# Patient Record
Sex: FEMALE | Race: BLACK OR AFRICAN AMERICAN | Employment: PART TIME | URBAN - METROPOLITAN AREA
[De-identification: names, ages, dates, MRNs, and addresses within clinical notes are randomized per-mention and may not be internally consistent; named-entity substitution may affect disease eponyms.]

---

## 2021-12-11 ENCOUNTER — APPOINTMENT (OUTPATIENT)
Dept: MRI IMAGING | Age: 31
End: 2021-12-11
Attending: EMERGENCY MEDICINE
Payer: COMMERCIAL

## 2021-12-11 ENCOUNTER — APPOINTMENT (OUTPATIENT)
Dept: CT IMAGING | Age: 31
End: 2021-12-11
Attending: EMERGENCY MEDICINE
Payer: COMMERCIAL

## 2021-12-11 ENCOUNTER — HOSPITAL ENCOUNTER (EMERGENCY)
Age: 31
Discharge: HOME | End: 2021-12-11
Attending: EMERGENCY MEDICINE
Payer: COMMERCIAL

## 2021-12-11 VITALS
WEIGHT: 262 LBS | RESPIRATION RATE: 17 BRPM | TEMPERATURE: 97.8 F | BODY MASS INDEX: 37.51 KG/M2 | DIASTOLIC BLOOD PRESSURE: 76 MMHG | HEIGHT: 70 IN | HEART RATE: 93 BPM | OXYGEN SATURATION: 100 % | SYSTOLIC BLOOD PRESSURE: 133 MMHG

## 2021-12-11 DIAGNOSIS — R20.0 LEFT LEG NUMBNESS: ICD-10-CM

## 2021-12-11 DIAGNOSIS — G43.809 OTHER MIGRAINE WITHOUT STATUS MIGRAINOSUS, NOT INTRACTABLE: Primary | ICD-10-CM

## 2021-12-11 LAB
ALBUMIN SERPL-MCNC: 3.5 G/DL (ref 3.5–5)
ALBUMIN/GLOB SERPL: 0.7 {RATIO} (ref 1.1–2.2)
ALP SERPL-CCNC: 110 U/L (ref 45–117)
ALT SERPL-CCNC: 26 U/L (ref 12–78)
ANION GAP SERPL CALC-SCNC: 5 MMOL/L (ref 5–15)
AST SERPL-CCNC: 28 U/L (ref 15–37)
BASOPHILS # BLD: 0 K/UL (ref 0–0.1)
BASOPHILS NFR BLD: 1 % (ref 0–1)
BILIRUB SERPL-MCNC: 0.5 MG/DL (ref 0.2–1)
BUN SERPL-MCNC: 7 MG/DL (ref 6–20)
BUN/CREAT SERPL: 8 (ref 12–20)
CALCIUM SERPL-MCNC: 8.9 MG/DL (ref 8.5–10.1)
CHLORIDE SERPL-SCNC: 111 MMOL/L (ref 97–108)
CO2 SERPL-SCNC: 24 MMOL/L (ref 21–32)
CREAT SERPL-MCNC: 0.86 MG/DL (ref 0.55–1.02)
DIFFERENTIAL METHOD BLD: ABNORMAL
EOSINOPHIL # BLD: 0.2 K/UL (ref 0–0.4)
EOSINOPHIL NFR BLD: 3 % (ref 0–7)
ERYTHROCYTE [DISTWIDTH] IN BLOOD BY AUTOMATED COUNT: 19.9 % (ref 11.5–14.5)
GLOBULIN SER CALC-MCNC: 4.7 G/DL (ref 2–4)
GLUCOSE SERPL-MCNC: 108 MG/DL (ref 65–100)
HCG UR QL: NEGATIVE
HCT VFR BLD AUTO: 30.8 % (ref 35–47)
HGB BLD-MCNC: 9 G/DL (ref 11.5–16)
IMM GRANULOCYTES # BLD AUTO: 0 K/UL (ref 0–0.04)
IMM GRANULOCYTES NFR BLD AUTO: 0 % (ref 0–0.5)
LYMPHOCYTES # BLD: 2.4 K/UL (ref 0.8–3.5)
LYMPHOCYTES NFR BLD: 41 % (ref 12–49)
MCH RBC QN AUTO: 22 PG (ref 26–34)
MCHC RBC AUTO-ENTMCNC: 29.2 G/DL (ref 30–36.5)
MCV RBC AUTO: 75.1 FL (ref 80–99)
MONOCYTES # BLD: 0.4 K/UL (ref 0–1)
MONOCYTES NFR BLD: 7 % (ref 5–13)
NEUTS SEG # BLD: 2.9 K/UL (ref 1.8–8)
NEUTS SEG NFR BLD: 49 % (ref 32–75)
NRBC # BLD: 0 K/UL (ref 0–0.01)
NRBC BLD-RTO: 0 PER 100 WBC
PLATELET # BLD AUTO: 287 K/UL (ref 150–400)
POTASSIUM SERPL-SCNC: 3.5 MMOL/L (ref 3.5–5.1)
PROT SERPL-MCNC: 8.2 G/DL (ref 6.4–8.2)
RBC # BLD AUTO: 4.1 M/UL (ref 3.8–5.2)
SODIUM SERPL-SCNC: 140 MMOL/L (ref 136–145)
WBC # BLD AUTO: 5.9 K/UL (ref 3.6–11)

## 2021-12-11 PROCEDURE — 96375 TX/PRO/DX INJ NEW DRUG ADDON: CPT

## 2021-12-11 PROCEDURE — 96365 THER/PROPH/DIAG IV INF INIT: CPT

## 2021-12-11 PROCEDURE — 85025 COMPLETE CBC W/AUTO DIFF WBC: CPT

## 2021-12-11 PROCEDURE — 36415 COLL VENOUS BLD VENIPUNCTURE: CPT

## 2021-12-11 PROCEDURE — 74011000636 HC RX REV CODE- 636

## 2021-12-11 PROCEDURE — 96366 THER/PROPH/DIAG IV INF ADDON: CPT

## 2021-12-11 PROCEDURE — 99285 EMERGENCY DEPT VISIT HI MDM: CPT

## 2021-12-11 PROCEDURE — 70496 CT ANGIOGRAPHY HEAD: CPT

## 2021-12-11 PROCEDURE — 80053 COMPREHEN METABOLIC PANEL: CPT

## 2021-12-11 PROCEDURE — 81025 URINE PREGNANCY TEST: CPT

## 2021-12-11 PROCEDURE — 74011250636 HC RX REV CODE- 250/636: Performed by: RADIOLOGY

## 2021-12-11 PROCEDURE — A9575 INJ GADOTERATE MEGLUMI 0.1ML: HCPCS | Performed by: RADIOLOGY

## 2021-12-11 PROCEDURE — 74011250636 HC RX REV CODE- 250/636: Performed by: EMERGENCY MEDICINE

## 2021-12-11 PROCEDURE — 70450 CT HEAD/BRAIN W/O DYE: CPT

## 2021-12-11 PROCEDURE — 70553 MRI BRAIN STEM W/O & W/DYE: CPT

## 2021-12-11 PROCEDURE — 70544 MR ANGIOGRAPHY HEAD W/O DYE: CPT

## 2021-12-11 RX ORDER — KETOROLAC TROMETHAMINE 30 MG/ML
15 INJECTION, SOLUTION INTRAMUSCULAR; INTRAVENOUS
Status: COMPLETED | OUTPATIENT
Start: 2021-12-11 | End: 2021-12-11

## 2021-12-11 RX ORDER — PROCHLORPERAZINE EDISYLATE 5 MG/ML
10 INJECTION INTRAMUSCULAR; INTRAVENOUS
Status: COMPLETED | OUTPATIENT
Start: 2021-12-11 | End: 2021-12-11

## 2021-12-11 RX ORDER — HEPARIN SODIUM 5000 [USP'U]/ML
4000 INJECTION, SOLUTION INTRAVENOUS; SUBCUTANEOUS ONCE
Status: DISCONTINUED | OUTPATIENT
Start: 2021-12-11 | End: 2021-12-11

## 2021-12-11 RX ORDER — GADOTERATE MEGLUMINE 376.9 MG/ML
20 INJECTION INTRAVENOUS
Status: COMPLETED | OUTPATIENT
Start: 2021-12-11 | End: 2021-12-11

## 2021-12-11 RX ORDER — CLOPIDOGREL BISULFATE 75 MG/1
300 TABLET ORAL ONCE
Status: DISCONTINUED | OUTPATIENT
Start: 2021-12-11 | End: 2021-12-11

## 2021-12-11 RX ORDER — MAGNESIUM SULFATE HEPTAHYDRATE 40 MG/ML
2 INJECTION, SOLUTION INTRAVENOUS ONCE
Status: COMPLETED | OUTPATIENT
Start: 2021-12-11 | End: 2021-12-11

## 2021-12-11 RX ORDER — DIPHENHYDRAMINE HYDROCHLORIDE 50 MG/ML
25 INJECTION, SOLUTION INTRAMUSCULAR; INTRAVENOUS
Status: COMPLETED | OUTPATIENT
Start: 2021-12-11 | End: 2021-12-11

## 2021-12-11 RX ADMIN — MAGNESIUM SULFATE HEPTAHYDRATE 2 G: 2 INJECTION, SOLUTION INTRAVENOUS at 12:16

## 2021-12-11 RX ADMIN — PROCHLORPERAZINE EDISYLATE 10 MG: 5 INJECTION INTRAMUSCULAR; INTRAVENOUS at 09:30

## 2021-12-11 RX ADMIN — SODIUM CHLORIDE 1000 ML: 9 INJECTION, SOLUTION INTRAVENOUS at 09:30

## 2021-12-11 RX ADMIN — KETOROLAC TROMETHAMINE 15 MG: 30 INJECTION, SOLUTION INTRAMUSCULAR at 09:25

## 2021-12-11 RX ADMIN — IOPAMIDOL 100 ML: 755 INJECTION, SOLUTION INTRAVENOUS at 10:02

## 2021-12-11 RX ADMIN — DIPHENHYDRAMINE HYDROCHLORIDE 25 MG: 50 INJECTION, SOLUTION INTRAMUSCULAR; INTRAVENOUS at 09:18

## 2021-12-11 RX ADMIN — GADOTERATE MEGLUMINE 20 ML: 376.9 INJECTION INTRAVENOUS at 14:00

## 2021-12-11 NOTE — Clinical Note
[facility]  [facility] EMERGENCY DEPT  [address] [phone]  [phone]    Work/School Note    Date: 12/11/2021    To Whom It May concern:    Ene Greer was seen and treated today in the emergency room by the following provider(s):  Attending Provider: Ethelene Duane, MD.      Ene Greer is excused from work/school on 12/11/21 and 12/12/21. She is medically clear to return to work/school on 12/13/2021.    Patient should not be doing any bending over or lifting until cleared by neurologist.    Sincerely,          Fadia Hernandez MD

## 2021-12-11 NOTE — ED TRIAGE NOTES
Patient arrives ambulatory to ED with complaints of headache for a few weeks    Denies taking medications for pain     History of TIA in August , missed neuro follow up last week

## 2021-12-11 NOTE — DISCHARGE INSTRUCTIONS
Please return to the emergency department if your symptoms are worsening, you have recurrent numbness or weakness in your extremities, or have any other symptoms you find concerning. In the meantime please follow-up with the primary care doctor and neurologist by calling the numbers provided.

## 2021-12-11 NOTE — ED PROVIDER NOTES
HPI   16-year-old woman with a history of previous TIA who presents to the emergency department with a headache. She has had an intermittent headache off and on for the last 2 weeks. She describes as a throbbing sensation on the left side of her head. This is made worse when she bends over and does work at [employer] she works out. She denies any associated visual changes, or photophobia. In [place] she previously presented to the emergency department with upper and lower extremity weakness as well as headache. 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 She tells me last night she had an episode where her left leg felt numb but this resolved quickly. She denies any weakness in her upper and lower extremities or speech difficulties. She is not anticoagulated. She denies any nausea or vomiting. No past medical history on file. No past surgical history on file. No family history on file. Social History     Socioeconomic History    Marital status: SINGLE     Spouse name: Not on file    Number of children: Not on file    Years of education: Not on file    Highest education level: Not on file   Occupational History    Not on file   Tobacco Use    Smoking status: Not on file    Smokeless tobacco: Not on file   Substance and Sexual Activity    Alcohol use: Not on file    Drug use: Not on file    Sexual activity: Not on file   Other Topics Concern    Not on file   Social History Narrative    Not on file     Social Determinants of Health     Financial Resource Strain:     Difficulty of Paying Living Expenses: Not on file   Food Insecurity:     Worried About Running Out of Food in the Last Year: Not on file    Curtis of Food in the Last Year: Not on file   Transportation Needs:     Lack of Transportation (Medical): Not on file    Lack of Transportation (Non-Medical):  Not on file   Physical Activity:     Days of Exercise per Week: Not on file    Minutes of Exercise per Session: Not on file   Stress:     Feeling of Stress : Not on file   Social Connections:     Frequency of Communication with Friends and Family: Not on file    Frequency of Social Gatherings with Friends and Family: Not on file    Attends [employer] Services: Not on file    Active Member of [address] [place] [address] or Organizations: Not on file    Attends Club or Organization Meetings: Not on file    Marital Status: Not on file   Intimate Partner Violence:     Fear of Current or Ex-Partner: Not on file    Emotionally Abused: Not on file    Physically Abused: Not on file    Sexually Abused: Not on file   Housing Stability:     Unable to Pay for Housing in the Last Year: Not on file    Number of [place] in the Last Year: Not on file    Unstable Housing in the Last Year: Not on file         ALLERGIES: Patient has no known allergies. Review of Systems   A complete review of systems was performed and all systems reviewed were negative unless documented in the HPI. Vitals:    12/11/21 0832   BP: (!) 152/90   Pulse: 93   Resp: 17   Temp: 97.8 °F (36.6 °C)   SpO2: 98%   Height: 5' 10\" (1.778 m)            Physical Exam  Constitutional:       Comments: Appears well. No acute distress noted   Eyes:      Comments: Pupils are 3 mm and reactive to light bilaterally. No nystagmus. Extraocular movements intact. No visual field deficits   Neck:      Comments: Trachea midline. No carotid bruits  Cardiovascular:      Comments: Regular rate and rhythm without murmurs. 2+ radial pulses bilaterally. Pulmonary:      Effort: Pulmonary effort is normal. No respiratory distress. Breath sounds: Normal breath sounds. No wheezing or rales. Abdominal:      Comments: Soft, nontender   Musculoskeletal:         General: Normal range of motion. Right lower leg: No edema. Left lower leg: No edema. Skin:     General: Skin is warm and dry. Neurological:      Comments: Awake and alert. Speech is normal without dysarthria. No facial droop. No drift in the upper extremities. No drift in the lower extremities. 5 out of 5 strength with all movements of the bilateral upper and lower extremities. No dysmetria with finger-to-nose testing. Ambulates with a normal gait. Able to name all objects. NIH stroke scale of 0. MDM   41-year-old woman who presents with the above chief complaint. Vital signs stable. She has no focal neurologic deficits. Her gait is normal.  For her headache I am going to order her a migraine cocktail. I have a low suspicion for intracranial hemorrhage as the description of the patient's headache does not really fit with a subarachnoid. Given her previous history of MCA stenosis I am going to order CT of the head and CTA of the head and neck. I suspect neurology consultation given the patient report of left leg numbness last night. Labs are reassuring. CT scan showed no definitive abnormalities. Teleneurology was consulted. I spoke with Dr. Manfred Smith who recommended an MRI of the brain as well as an MRV. MRI as well as the patient's labs are all reassuring. She endorsed significant improvement in her headache and was deemed safe for discharge. She is given numbers to call neurology as well as to establish care with family medicine. Patient discharged in stable condition.         Procedures

## 2021-12-11 NOTE — Clinical Note
[address]  [facility] EMERGENCY DEPT  [address] [phone]  [phone]    Work/School Note    Date: 12/11/2021    To Whom It May concern:    Jessie Jones was seen and treated today in the emergency room by the following provider(s):  Attending Provider: Hailee Curran MD.      Jessie Jones is excused from work/school on 12/11/21 and 12/12/21. She is medically clear to return to work/school on 12/13/2021.    Patient should not be doing any bending over or lifting until cleared by neurologist.    Sincerely,          Otto Ceballos MD

## 2021-12-17 ENCOUNTER — OFFICE VISIT (OUTPATIENT)
Dept: FAMILY MEDICINE CLINIC | Age: 31
End: 2021-12-17
Payer: COMMERCIAL

## 2021-12-17 VITALS
TEMPERATURE: 98.2 F | SYSTOLIC BLOOD PRESSURE: 132 MMHG | OXYGEN SATURATION: 98 % | HEIGHT: 70 IN | BODY MASS INDEX: 38.8 KG/M2 | WEIGHT: 271 LBS | RESPIRATION RATE: 18 BRPM | DIASTOLIC BLOOD PRESSURE: 85 MMHG | HEART RATE: 84 BPM

## 2021-12-17 DIAGNOSIS — G89.29 CHRONIC INTRACTABLE HEADACHE, UNSPECIFIED HEADACHE TYPE: Primary | ICD-10-CM

## 2021-12-17 DIAGNOSIS — D50.9 IRON DEFICIENCY ANEMIA, UNSPECIFIED IRON DEFICIENCY ANEMIA TYPE: ICD-10-CM

## 2021-12-17 DIAGNOSIS — Z86.73 HISTORY OF TIA (TRANSIENT ISCHEMIC ATTACK): ICD-10-CM

## 2021-12-17 DIAGNOSIS — Z76.89 ENCOUNTER TO ESTABLISH CARE: ICD-10-CM

## 2021-12-17 DIAGNOSIS — M62.838 TRAPEZIUS MUSCLE SPASM: ICD-10-CM

## 2021-12-17 DIAGNOSIS — R51.9 CHRONIC INTRACTABLE HEADACHE, UNSPECIFIED HEADACHE TYPE: Primary | ICD-10-CM

## 2021-12-17 PROCEDURE — 99213 OFFICE O/P EST LOW 20 MIN: CPT | Performed by: STUDENT IN AN ORGANIZED HEALTH CARE EDUCATION/TRAINING PROGRAM

## 2021-12-17 PROCEDURE — 99385 PREV VISIT NEW AGE 18-39: CPT | Performed by: STUDENT IN AN ORGANIZED HEALTH CARE EDUCATION/TRAINING PROGRAM

## 2021-12-17 RX ORDER — LANOLIN ALCOHOL/MO/W.PET/CERES
325 CREAM (GRAM) TOPICAL
COMMUNITY
Start: 2021-08-05 | End: 2022-11-04

## 2021-12-17 NOTE — PATIENT INSTRUCTIONS
Tension Headache: Care Instructions  Overview  Most headaches are tension headaches. Some people get them often, especially if they have a lot of stress in their lives. This kind of headache may cause pain or a feeling of pressure all over your head. Sometimes it's hard to know where the center of the pain is. If you get a lot of these kind of headaches, the best way to reduce them is to find out what's causing them. Then you can make changes in those areas. Follow-up care is a key part of your treatment and safety. Be sure to make and go to all appointments, and call your doctor if you are having problems. It's also a good idea to know your test results and keep a list of the medicines you take. How can you care for yourself at home? · Rest in a quiet, dark room. Put a cool cloth on your forehead. Close your eyes, and try to relax or go to sleep. Do not watch TV, read, or use the computer. · Use a warm, moist towel or a heating pad set on low to relax tight shoulder and neck muscles. · Have someone gently massage your neck and shoulders. · Be safe with medicines. Read and follow all instructions on the label. ? If the doctor gave you a prescription medicine for pain, take it as prescribed. ? If you are not taking a prescription pain medicine, ask your doctor if you can take an over-the-counter medicine. · Be careful not to take more pain medicine than the instructions say. This is because you may get worse or more frequent headaches when the medicine wears off. · If you get a headache, stop what you are doing and sit quietly for a moment. Close your eyes and breathe slowly. Try to relax your head and neck muscles. · Pay attention to any new symptoms you have when you have a headache. These include a fever, weakness or numbness, vision changes, or confusion. They may be signs of a more serious problem. To help prevent headaches  · Keep a headache diary.  This can help you and your doctor figure out what triggers your headaches. If you avoid your triggers, you may be able to prevent headaches. · It's good to include several things in your headache diary. Write down when a headache begins and how long it lasts. Try to describe what the pain was like (throbbing, aching, stabbing, or dull). Then add anything you think may have triggered the headache. This could include stress, anxiety, or depression. It could also include hunger, anger, or fatigue. Sometimes, bad posture and muscle strain are triggers for people. · Find healthy ways to deal with stress. Headaches are most common during or right after stressful times. Take time to relax before and after you do something that caused a headache in the past.  · Get plenty of exercise every day. Go for a walk or jog, ride a bike, or find other ways to be active. This can help with stress and muscle tension. · Get regular sleep. · Eat regularly and well. If you wait too long to eat, it can trigger a headache. · If you have the time and money, you may want to try massage. Some people find that regular massages really help relieve tension. · Try to use good posture and keep the muscles of your jaw, face, neck, and shoulders relaxed. If you sit at a desk, change positions often. Try to stretch for 30 seconds every hour. · If you use a computer a lot, you can do things to make your eyes less tired. Try blinking more and sometimes looking away from the screen. Be sure to use glasses or contacts if you need them. And check that your monitor is about an arm's distance away from you. When should you call for help? Call 911 anytime you think you may need emergency care. For example, call if:    · You have signs of a stroke. These may include:  ? Sudden numbness, paralysis, or weakness in your face, arm, or leg, especially on only one side of your body. ? Sudden vision changes. ? Sudden trouble speaking.   ? Sudden confusion or trouble understanding simple statements. ? Sudden problems with walking or balance. ? A sudden, severe headache that is different from past headaches. Call your doctor now or seek immediate medical care if:    · You have new or worse nausea and vomiting.     · You have a new or higher fever.     · Your headache gets much worse. Watch closely for changes in your health, and be sure to contact your doctor if:    · You are not getting better after 2 days (48 hours). Where can you learn more? Go to http://www.gray.com/  Enter C544 in the search box to learn more about \"Tension Headache: Care Instructions. \"  Current as of: April 8, 2021               Content Version: 13.0  © 2088-0680 [employer]. Care instructions adapted under license by [employer] (which disclaims liability or warranty for this information). If you have questions about a medical condition or this instruction, always ask your healthcare professional. [place] [address] any warranty or liability for your use of this information. Walking for Exercise: Care Instructions  Your Care Instructions     Walking is one of the easiest ways to get the exercise you need for good health. A brisk, 30-minute walk each day can help you feel better and have more energy. It can help you lower your risk of disease. Walking can help you keep your bones strong and your heart healthy. Check with your doctor before you start a walking plan if you have heart problems, other health issues, or you have not been active in a long time. Follow your doctor's instructions for safe levels of exercise. Follow-up care is a key part of your treatment and safety. Be sure to make and go to all appointments, and call your doctor if you are having problems. It's also a good idea to know your test results and keep a list of the medicines you take. How can you care for yourself at home?   Getting started  · Start slowly and set a short-term goal. For example, walk for 5 or 10 minutes every day. · Bit by bit, increase the amount you walk every day. Try for at least 30 minutes on most days of the week. You also may want to swim, bike, or do other activities. · If finding enough time is a problem, it's fine to be active in shorter periods of time throughout your day. · To get the heart-healthy benefits of walking, you need to walk briskly enough to increase your heart rate and breathing, but not so fast that you can't talk comfortably. · Wear comfortable shoes that fit well and provide good support for your feet and ankles. Staying with your plan  · After you've made walking a habit, set a longer-term goal. You may want to set a goal of walking briskly for longer or walking farther. Experts say to do 2½ hours (150 minutes) of moderate activity a week. A faster heartbeat is what defines moderate-level activity. · To stay motivated, walk with friends, coworkers, or pets. · Use a phone vikki or pedometer to track your steps each day. Set a goal to increase your steps. When you reach that goal, set a higher goal.  · If the weather keeps you from walking outside, go for walks at the mall with a friend. Local schools and churches may have indoor gyms where you can walk. Fitting a walk into your workday  · Park several blocks away from work, or get off the bus a few stops early. · Use the stairs instead of the elevator, at least for a few floors. · Suggest holding meetings with colleagues during a walk inside or outside the building. · Use the restroom that is the farthest from your desk or workstation. · Use your morning and afternoon breaks to take quick 15 minutes walks. Staying safe  · Know your surroundings. Walk in a well-lighted, safe place. If it's dark, walk with a partner. Wear light-colored clothing. If you can, buy a vest or jacket that reflects light. · Carry a cell phone for emergencies. · Drink plenty of water.  Take a water bottle with you when you walk. This is very important if it is hot out. · Be careful not to slip on wet or icy ground. You can buy \"grippers\" for your shoes to help keep you from slipping. · Pay attention to your walking surface. Use sidewalks and paths. · If you have health issues such as asthma, COPD, or heart problems, or if you haven't been active for a long time, check with your doctor before you start a new activity. Where can you learn more? Go to http://www.gray.com/  Enter R159 in the search box to learn more about \"Walking for Exercise: Care Instructions. \"  Current as of: May 12, 2021               Content Version: 13.0  © 2006-2021 [employer]. Care instructions adapted under license by [employer] (which disclaims liability or warranty for this information). If you have questions about a medical condition or this instruction, always ask your healthcare professional. [place] [address] any warranty or liability for your use of this information.

## 2021-12-17 NOTE — PROGRESS NOTES
Edwardo Coley is an 27 y.o. female who presents to [facility] care   Patient was previously receiving care at: [facility] in [place]. Medical history significant for: H/o TIA in 8/2021, Borderline HTN?, Chronic anemia. Headache:      Onset: 8/2021. Localization: Mostly on L side of head. Frequency: Every day   Type: Dull-Pulsatile  Intensity: 6-7/10. Radiation: To the back of her head   Alleviating factors: Relaxation. Aggravating factors: None  Meds that have tried: Aleve as needed. Once a day. N/V/photophobia/phonophobia: No.   Vision problems: Mentioned her vision sometimes goes blurred. Denies: fever, neck stiffness, no seizure, no trauma, no weakness, no tingling, no dizziness. Has been having some insomnia. Pt worries about so many things, home dynamics, making sure daughter does well at school. Fiance obese and she is trying to make sure he is eating well and sing exercise. Finances, etc.     PMHx:    H/o TIA: Per chart review: \"In [place] she previously presented to the emergency department with upper and lower extremity weakness as well as headache. 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 Pt stated that she does not remember if she has to be taking any medication as she was in the middle of moving to the area when this happened. Has upcoming appt with Neurology on 2/3/22. Chronic Anemia: Has heavy menses. Most recent Hg on 12/11 is :9.0. Taking Iron daily. HTN: Borderline, no meds. Was advised to measure BP and work on lifestyle changes. Which has not been done. Diet: Balanced. Homemade food mostly. Exercise: Not at all. Social: Lives with ankur and daughter (15 yo)  Alcohol: Socially  Smoking: One cigar once in a while. Drugs: No.    Gyn Care  LMP: ~ 11/15/21. Regular, heavy bleeding, duration of ~ 7 days. Mild cramping.    PAP Smear: More than three years. Normal before. Will get records. She will schedule appt for Pap Smear. Covid vaccine: Not planning on getting it. Flu vaccine: Not planning on getting it. Review of Systems   General/Constitutional:   Headaches, denies fever, fatigue, weight loss or weight gain       Eyes:   No redness, pruritis, pain, visual changes, swelling, or discharge      Ears:    No pain, loss or changes in hearing     Neck:   No swelling, masses, stiffness, pain, or limited movement     Cardiac:    No chest pain      Respiratory:   No cough or shortness of breath     GI:   No nausea/vomiting, diarrhea, abdominal pain, bloody or dark stools       :   No dysuria or  Hematuria. Heavy menses. Neurological:   No loss of consciousness, dizziness, seizures, dysarthria, cognitive changes, memory changes,  problems with balance, or unilateral weakness     Skin: No rash     Current Medications  Current medications include:   Current Outpatient Medications   Medication Sig    ferrous sulfate 325 mg (65 mg iron) tablet Take 325 mg by mouth. No current facility-administered medications for this visit. Allergies  No Known Allergies    Past Medical History  Past Medical History:   Diagnosis Date    Stomach ulcer     TIA (transient ischemic attack)        Past Surgical History   History reviewed. No pertinent surgical history.     Family History  Family History   Problem Relation Age of Onset    Hypertension Mother     Diabetes Father     Hypertension Father        No FH of breast cancer: No  No FH of colon cancer: No     Social History  Social History     Socioeconomic History    Marital status: SINGLE     Spouse name: Not on file    Number of children: Not on file    Years of education: Not on file    Highest education level: Not on file   Occupational History    Not on file   Tobacco Use    Smoking status: Never Smoker    Smokeless tobacco: Never Used   Vaping Use    Vaping Use: Never used   Substance and Sexual Activity    Alcohol use: Yes     Comment: occ    Drug use: Not Currently    Sexual activity: Yes     Partners: Male   Other Topics Concern    Not on file   Social History Narrative    Not on file     Social Determinants of Health     Financial Resource Strain:     Difficulty of Paying Living Expenses: Not on file   Food Insecurity:     Worried About Running Out of Food in the Last Year: Not on file    Curtis of Food in the Last Year: Not on file   Transportation Needs:     Lack of Transportation (Medical): Not on file    Lack of Transportation (Non-Medical): Not on file   Physical Activity:     Days of Exercise per Week: Not on file    Minutes of Exercise per Session: Not on file   Stress:     Feeling of Stress : Not on file   Social Connections:     Frequency of Communication with Friends and Family: Not on file    Frequency of Social Gatherings with Friends and Family: Not on file    Attends Roman Catholic Services: Not on file    Active Member of [address] [place] [address] or Organizations: Not on file    Attends Club or Organization Meetings: Not on file    Marital Status: Not on file   Intimate Partner Violence:     Fear of Current or Ex-Partner: Not on file    Emotionally Abused: Not on file    Physically Abused: Not on file    Sexually Abused: Not on file   Housing Stability:     Unable to Pay for Housing in the Last Year: Not on file    Number of [place] in the Last Year: Not on file    Unstable Housing in the Last Year: Not on file       Immunizations    There is no immunization history on file for this patient. Health Maintenance  Colonoscopy: N/A  DEXA scan: N/A  HIV testing: Per pt done prior. Neg. Hepatitis C testing: Declined.   Lung cancer screening: N/A    Objective   Vital Signs  Visit Vitals  /85 (BP 1 Location: Right arm, BP Patient Position: Sitting, BP Cuff Size: Large adult)   Pulse 84   Temp 98.2 °F (36.8 °C) (Temporal)   Resp 18   Ht 5' 10\" (1.778 m)   Wt 271 lb (122.9 kg)   LMP 11/11/2021 (Approximate)   SpO2 98%   BMI 38.88 kg/m²       Physical Examination  GEN: No apparent distress. Alert and oriented and responds to all questions appropriately. EYES:  Conjunctiva clear; pupils round and reactive to light; extraocular movements areintact. EAR: External ears are normal.  Tympanic membranes are clear and without effusion. NOSE: Turbinates are within normal limits. No drainage  OROPHYARYNX: No oral lesions or exudates. NECK:  Supple; no masses; thyroid normal. Muscle tightness in trapezius area. LUNGS: Respirations unlabored; clear to auscultation bilaterally  CARDIOVASCULAR: Regular, rate, and rhythm without murmurs, gallops or rubs   ABDOMEN: Soft; nontender; nondistended; normoactive bowel sounds; no masses or organomegaly  NEUROLOGIC:  No focal neurologic deficits. Strength and sensation grossly intact. Coordination and gait grossly intact. EXT: Well perfused. No edema. SKIN: No obvious rashes. Assessment:   Jessie Jones is a 27 y.o. here to establish to care     Plan:   Encounter to establish care  · Counseled re: diet, exercise, healthy lifestyle  · Appropriate labs, and referrals ordered as listed above  · The patient was counseled on the dangers of tobacco use, and was advised to quit. · Will obtain medical records. · TSH ordered. - She will schedule appt for Pap Smear.   - Advised to get Covid and Flu vaccines. Follow-up and Dispositions    · Return in about 4 weeks (around 1/14/2022), or if symptoms worsen or fail to improve, for Blood pressure numbers. Headache. PAP Smear. Lisa  Headache: Per patient description seems like is mostly tension related. Recent ED visit with negative work up including Head imaging. Pt obese, no taking OCPs. Anemia may cause HA as well, however Hg improved from 6.6 to 9.0. Will try to r/o TA.   - Reassurance provided   - Advised on continue relaxation techniques.    - Tylenol 650 mg PO q4-6 hrs as needed for pain or Motrin 600 mg PO q8hrs. Aleve 220 mg PO BID daily if aforementioned do not help. - Will get CRP and ESR   - Pt has upcoming appt with Neurology (2/3/22). Info given for Dr. Oumar Contreras and see if they have sooner availability. Trapezius muscle spasm:  - Heat/ice compresses advised  - Relaxation advised  - Pain control as above. H/o TIA: Per chart review: \"In [place] she previously presented to the emergency department with upper and lower extremity weakness as well as headache. She was found to have a left M1 stenosis\". Not taking any meds. - Pt has upcoming appt with Neurology (2/3/22). Info given for Dr. Oumar Contreras and see if they have sooner availability. - Will get MR. Anemia: Heavy menses for years. Hg improved from 6.6 to 9.0. Per labs seems like meets criteria for KISHOR. - Continue Iron PO daily. - Will monitor Hg in 3 months. - Advised iron-rich foods. HTN??: Was told she had borderline HTN, no meds. Was advised to measure BP at home and work on diet, weight loss and exercise. Which pt has not been doing.   - Advised to get BP cuff and measure BP at home and keep a log.   - Follow up in 4 weeks     I discussed the aforementioned diagnoses with the patient as well as the plan of care. All questions were answered and an AVS was provided. I discussed this patient with Dr. Adina Snellen (Attending Physician)      Signed By:  Tulio Pappas MD   Family Medicine Resident.

## 2021-12-17 NOTE — PROGRESS NOTES
Identified Patient with two Patient identifiers (Name and ). Two Patient Identifiers confirmed. Reviewed record in preparation for visit and have obtained necessary documentation. Chief Complaint   Patient presents with    Establish Care    Headache       Visit Vitals  /85 (BP 1 Location: Right arm, BP Patient Position: Sitting, BP Cuff Size: Large adult)   Pulse 84   Temp 98.2 °F (36.8 °C) (Temporal)   Resp 18   Ht 5' 10\" (1.778 m)   Wt 271 lb (122.9 kg)   SpO2 98%   BMI 38.88 kg/m²       1. Have you been to the ER, urgent care clinic since your last visit? Hospitalized since your last visit? No    2. Have you seen or consulted any other health care providers outside of the [address] [place] [address] since your last visit? Include any pap smears or colon screening.  No

## 2021-12-18 LAB
CRP SERPL-MCNC: 0.29 MG/DL (ref 0–0.6)
ERYTHROCYTE [SEDIMENTATION RATE] IN BLOOD: 44 MM/HR (ref 0–20)
TSH SERPL DL<=0.05 MIU/L-ACNC: 1.49 UIU/ML (ref 0.36–3.74)

## 2022-01-10 ENCOUNTER — TELEPHONE (OUTPATIENT)
Dept: FAMILY MEDICINE CLINIC | Age: 32
End: 2022-01-10

## 2022-01-10 NOTE — TELEPHONE ENCOUNTER
----- Message from Luca Faystephanie sent at 1/6/2022 12:15 PM EST -----  Subject: Message to Provider    QUESTIONS  Information for Provider? Pt is calling because she has forms that need to   be completed by the date of her appt on 1/11/22. These forms are for   Clarification of her Limitation for work. She would like to drop them off   to be completed before her appt. Please give pt. a call back. ---------------------------------------------------------------------------  --------------  Phong Melo INFO  What is the best way for the office to contact you? OK to leave message on   voicemail  Preferred Call Back Phone Number?  [phone]  ---------------------------------------------------------------------------  --------------  SCRIPT ANSWERS  undefined

## 2022-01-11 ENCOUNTER — VIRTUAL VISIT (OUTPATIENT)
Dept: FAMILY MEDICINE CLINIC | Age: 32
End: 2022-01-11
Payer: COMMERCIAL

## 2022-01-11 DIAGNOSIS — Z86.73 HISTORY OF TIA (TRANSIENT ISCHEMIC ATTACK): ICD-10-CM

## 2022-01-11 DIAGNOSIS — R51.9 CHRONIC INTRACTABLE HEADACHE, UNSPECIFIED HEADACHE TYPE: Primary | ICD-10-CM

## 2022-01-11 DIAGNOSIS — G89.29 CHRONIC INTRACTABLE HEADACHE, UNSPECIFIED HEADACHE TYPE: Primary | ICD-10-CM

## 2022-01-11 DIAGNOSIS — D50.9 IRON DEFICIENCY ANEMIA, UNSPECIFIED IRON DEFICIENCY ANEMIA TYPE: ICD-10-CM

## 2022-01-11 DIAGNOSIS — N92.6 MISSED MENSES: ICD-10-CM

## 2022-01-11 PROCEDURE — 99213 OFFICE O/P EST LOW 20 MIN: CPT | Performed by: STUDENT IN AN ORGANIZED HEALTH CARE EDUCATION/TRAINING PROGRAM

## 2022-01-11 RX ORDER — ASPIRIN 81 MG/1
81 TABLET ORAL DAILY
COMMUNITY
Start: 2022-01-11 | End: 2022-02-03 | Stop reason: SDUPTHER

## 2022-01-11 NOTE — PROGRESS NOTES
[address] Medicine Residency Attending Addendum:  Dr. Gracy Ramirez, DO,  the patient and I were not physically present during this encounter. The resident and I are concurrently monitoring the patient care through appropriate telecommunication technology. I discussed the findings, assessment and plan with the resident and agree with the resident's findings and plan as documented in the resident's note.       Lauren Gilmore MD

## 2022-01-11 NOTE — PROGRESS NOTES
Myla Summers  27 y.o. female  1990  [address]  [id number]   [address]:    Telemedicine Progress Note  Angela Wray DO       Encounter Date and Time: January 11, 2022 at 8:09 AM    Consent: Myla Summers, who was seen by synchronous (real-time) audio-video technology, and/or her healthcare decision maker, is aware that this patient-initiated, Telehealth encounter on 1/11/2022 is a billable service, with coverage as determined by her insurance carrier. She is aware that she may receive a bill and has provided verbal consent to proceed: Yes. Chief Complaint   Patient presents with    Headache     History of Present Illness   Myla Summers was evaluated by synchronous (real-time) audio-video technology from home, through a secure patient portal.    Myla Summers is a 27 y.o. female presents today with a chief complaint of headache, on disability and ready to return to work. Patient established care on 12/17. Mention of chronic headaches at that time, referred to neurology who she sees on 2/3/22. Suspect tension headaches, labs unremarkable for GCA. Patient also told she had a hypertension in the past however is not on meds, instructed to take BP at home but can not find BP cuff. Today she is requesting clearance to return to work, has been on disability since 12/14 for headaches following a \"ministroke\" in august.  Wants to return to work with limitations of no heavy limiting, works at [employer]. Also found to be covid positive on 1/3, overall symptoms have resolved. Discussed the need for pap smear per Dr. Tyler Iqbal visit on 12/28 and patient mentions she may be pregnant as she has missed 2 periods. See note on 12/17 for detailed description of headaches. Review of Systems   Review of Systems   Constitutional: Negative for chills and fever. HENT: Negative for congestion.     Respiratory: Negative for cough, sputum production and shortness of breath. Musculoskeletal: Negative. Neurological: Positive for dizziness (with bending), weakness and headaches (chronic). Vitals/Objective:     General: alert, cooperative, no distress   Mental  status: mental status: alert, oriented to person, place, and time, normal mood, behavior, speech, dress, motor activity, and thought processes   Resp: resp: normal effort and no respiratory distress   Neuro: neuro: no gross deficits   Skin: skin: no discoloration or lesions of concern on visible areas   Due to this being a TeleHealth evaluation, many elements of the physical examination are unable to be assessed. Assessment and Plan:   Time-based coding, delete if not needed: I spent at least 15 minutes with this established patient, and >50% of the time was spent counseling and/or coordinating care regarding headache    Diagnoses and all orders for this visit:    1. Chronic intractable headache, unspecified headache type: likely multifactorial due to tension and anemia (last Hgb 9.0). Started after TIA in   - follow up with neurology Feb 3  - will provide letter clearing patient for return to work with limitations of no heavy lifting and minimal bending as this exacerbate symptoms. 2. History of TIA (transient ischemic attack): in 8/2021. patient not on statin, will check lipid panel  -     LIPID PANEL; Future - to be completed at missed menses visit  - Continue aspirin 81mg daily    3. Iron deficiency anemia, unspecified iron deficiency anemia type  - Continue iron daily  - After ruling out pregnancy, consider starting OCPs for control of menorrhagia     4. Missed menses: took home pregnancy test that was \"defective\", did not result. - will schedule for missed menses visit    5. Preventative care  - Due for pap smear - if UPT negative, perform at missed menses if time permits. If UPT positive, perform at initial OB visit.     Follow up in 1-2 weeks for missed menses visit         We discussed the expected course, resolution and complications of the diagnosis(es) in detail. Medication risks, benefits, costs, interactions, and alternatives were discussed as indicated. I advised her to contact the office if her condition worsens, changes or fails to improve as anticipated. She expressed understanding with the diagnosis(es) and plan. Patient understands that this encounter was a temporary measure, and the importance of further follow up and examination was emphasized. Patient verbalized understanding. Electronically Signed: Rosie Holland DO    CPT Codes 63072-58068 for Established Patients may apply to this Telehealth Visit. POS code: 18. Modifier GT    Gypsy Bradford is a 27 y.o. female who was evaluated by an audio-video encounter for concerns as above. Patient identification was verified prior to start of the visit. A caregiver was present when appropriate. Due to this being a TeleHealth encounter (During XJFR-73 public health emergency), evaluation of the following organ systems was limited: Vitals/Constitutional/EENT/Resp/CV/GI//MS/Neuro/Skin/Heme-Lymph-Imm. Pursuant to the emergency declaration under the [facility] waiver authority and the [employer] and [employer] Act, this Virtual Visit was conducted, with patient's (and/or legal guardian's) consent, to reduce the patient's risk of exposure to COVID-19 and provide necessary medical care. Services were provided through a synchronous discussion virtually to substitute for in-person clinic visit. I was at home. The patient was at home. History   Patients past medical, surgical and family histories were reviewed and updated. Past Medical History:   Diagnosis Date    Stomach ulcer     TIA (transient ischemic attack)      No past surgical history on file.   Family History   Problem Relation Age of Onset    Hypertension Mother     Diabetes Father     Hypertension Father      Social History     Tobacco Use    Smoking status: Never Smoker    Smokeless tobacco: Never Used   Vaping Use    Vaping Use: Never used   Substance Use Topics    Alcohol use: Yes     Comment: occ    Drug use: Not Currently     There is no problem list on file for this patient. Current Medications/Allergies   Medications and Allergies reviewed:    Current Outpatient Medications   Medication Sig Dispense Refill    ferrous sulfate 325 mg (65 mg iron) tablet Take 325 mg by mouth.        No Known Allergies

## 2022-01-11 NOTE — LETTER
NOTIFICATION RETURN TO WORK / SCHOOL    1/11/2022 8:38 AM    Ms. Martin Yates  [address] [facility] [address]      To Whom It May Concern:    Martin Yates is currently under the care of [facility]. She is medically cleared to return to work on 1/12/2022 with the following limitations:  - No heavy lifting  - No repetitive bending    If there are questions or concerns please have the patient contact our office.         Sincerely,      Leanna Del Rio DO

## 2022-01-13 PROBLEM — Z86.73 HISTORY OF TIA (TRANSIENT ISCHEMIC ATTACK): Status: ACTIVE | Noted: 2022-01-13

## 2022-01-13 NOTE — TELEPHONE ENCOUNTER
Forms completed and faxed to  this AM for patient to .  contacted to make them aware of incoming fax.

## 2022-01-25 ENCOUNTER — OFFICE VISIT (OUTPATIENT)
Dept: FAMILY MEDICINE CLINIC | Age: 32
End: 2022-01-25
Payer: COMMERCIAL

## 2022-01-25 VITALS
BODY MASS INDEX: 38.6 KG/M2 | OXYGEN SATURATION: 98 % | WEIGHT: 269 LBS | TEMPERATURE: 98 F | DIASTOLIC BLOOD PRESSURE: 79 MMHG | SYSTOLIC BLOOD PRESSURE: 120 MMHG | RESPIRATION RATE: 16 BRPM | HEART RATE: 84 BPM

## 2022-01-25 DIAGNOSIS — E66.09 CLASS 2 OBESITY DUE TO EXCESS CALORIES WITHOUT SERIOUS COMORBIDITY WITH BODY MASS INDEX (BMI) OF 38.0 TO 38.9 IN ADULT: ICD-10-CM

## 2022-01-25 DIAGNOSIS — Z71.3 WEIGHT LOSS COUNSELING, ENCOUNTER FOR: Primary | ICD-10-CM

## 2022-01-25 DIAGNOSIS — F43.21 ADJUSTMENT DISORDER WITH DEPRESSED MOOD: ICD-10-CM

## 2022-01-25 PROCEDURE — 99214 OFFICE O/P EST MOD 30 MIN: CPT | Performed by: STUDENT IN AN ORGANIZED HEALTH CARE EDUCATION/TRAINING PROGRAM

## 2022-01-25 RX ORDER — BUPROPION HYDROCHLORIDE 150 MG/1
150 TABLET ORAL DAILY
Qty: 30 TABLET | Refills: 1 | Status: SHIPPED | OUTPATIENT
Start: 2022-01-25 | End: 2022-01-25

## 2022-01-25 RX ORDER — BUPROPION HYDROCHLORIDE 150 MG/1
150 TABLET ORAL DAILY
Qty: 30 TABLET | Refills: 1 | Status: SHIPPED | OUTPATIENT
Start: 2022-01-25 | End: 2022-11-04 | Stop reason: SINTOL

## 2022-01-25 NOTE — PROGRESS NOTES
[facility] [address]   Office [phone], Fax [phone]    Subjective:     Chief Complaint   Patient presents with    Weight Management     History provided by patient     Bebe Murry is a 27 y.o. female presents to discuss weight loss. She has gained about 40 pounds in the past 6-7 months. She also has noted fatigue, loss of interest, little pleasure in doing things. She has no hx of depression and has never been on medications or seen a therapist. She has had a lot of recent increased stressors including medical issues (TIA in 8/21), wedding planning, and moving recently. She is eating a healthy diet- has cut down on meat, bread, butter, oils, drinks a lot of water. She is also exercising 3x per week, doing a variety of cardio/strength training. Her thyroid levels were recently normal.    PHQ9: 12    Medication reviewed. Current Outpatient Medications:     buPROPion XL (WELLBUTRIN XL) 150 mg tablet, Take 1 Tablet by mouth daily. , Disp: 30 Tablet, Rfl: 1    aspirin delayed-release 81 mg tablet, Take 1 Tablet by mouth daily. , Disp: , Rfl:     ferrous sulfate 325 mg (65 mg iron) tablet, Take 325 mg by mouth., Disp: , Rfl:      Allergy reviewed. No Known Allergies     Past Medical History:   Diagnosis Date    Stomach ulcer     TIA (transient ischemic attack)        Social History     Socioeconomic History    Marital status: SINGLE   Tobacco Use    Smoking status: Never Smoker    Smokeless tobacco: Never Used   Vaping Use    Vaping Use: Never used   Substance and Sexual Activity    Alcohol use: Yes     Comment: occ    Drug use: Not Currently    Sexual activity: Yes     Partners: Male       Review of Systems   Constitutional: Positive for malaise/fatigue. Negative for chills, fever and weight loss. Respiratory: Negative for cough and shortness of breath. Cardiovascular: Negative for chest pain and palpitations.    Gastrointestinal: Negative for abdominal pain, constipation, diarrhea, nausea and vomiting. Neurological: Negative for dizziness. Psychiatric/Behavioral: Positive for depression. Negative for substance abuse and suicidal ideas. The patient is not nervous/anxious. Objective:   Vitals - reviewed  Visit Vitals  /79 (BP 1 Location: Left upper arm, BP Patient Position: Sitting, BP Cuff Size: Adult)   Pulse 84   Temp 98 °F (36.7 °C) (Temporal)   Resp 16   Wt 269 lb (122 kg)   SpO2 98%   BMI 38.60 kg/m²        Physical exam:   GEN: NAD. Alert. Well nourished. EYES:  Conjunctiva clear  NECK:  Supple; no masses; thyroid normal           LUNGS: Respirations unlabored; CTAB. no wheeze, rales, rhonchi   CARDIOVASCULAR: Regular, rate, and rhythm without murmurs, gallops or rubs   ABDOMEN: Soft; NT, ND. +bowel sounds; no rebound tenderness, no guarding. no masses or organomegaly  NEUROLOGIC:  No focal neurologic deficits. Strength and sensation grossly intact. MSK: FROM in all extremities (both passive and active). Good tone. No vertebral tenderness. EXT: Well perfused. No edema. No erythema. PSYCH: appropriate mood and affect. Good insight and judgement. Cooperative. SKIN: No obvious rashes or lesions. Assessment and orders:       ICD-10-CM ICD-9-CM    1. Weight loss counseling, encounter for  Z71.3 V65.3    2. Class 2 obesity due to excess calories without serious comorbidity with body mass index (BMI) of 38.0 to 38.9 in adult  E66.09 278.00 REFERRAL TO DIETITIAN    Z68.38 V85.38 buPROPion XL (WELLBUTRIN XL) 150 mg tablet      DISCONTINUED: buPROPion XL (WELLBUTRIN XL) 150 mg tablet   3.  Adjustment disorder with depressed mood  F43.21 309.0 buPROPion XL (WELLBUTRIN XL) 150 mg tablet     Obesity  - Counseled on weight loss   - Referred to dietician  - Referred to obesity clinic at [facility]  - Wellbutrin    Adjustment disorder with depressed mood  - Wellbutrin  - List of therapists provided to patient      Follow up in 6-8 weeks    Pt was discussed with Dr Amarilys Jenkins (attending physician). I have reviewed patient medical and social history and medications. I have reviewed pertinent labs results and other data. I have discussed the diagnosis with the patient and the intended plan as seen in the above orders. The patient has received an after-visit summary and questions were answered concerning future plans. I have discussed medication side effects and warnings with the patient as well.     Manny Martinez DO  Resident [facility]  01/25/22

## 2022-01-25 NOTE — PROGRESS NOTES
[address] Medicine Residency Attending Addendum:  Patient encounter was discussed on the day of the encounter with Hassel Simmonds, DO, performing the key elements of the service. I discussed the findings, assessment and plan with the resident and agree with the resident's findings and plan as documented in the resident's note.       Velia Duarte MD, CAQSM, RMSK

## 2022-01-25 NOTE — PATIENT INSTRUCTIONS
[facility]  Case management, outpatient and substance abuse counseling, medication management, non-insured, Medicaid, private insurance    [facility])  Referral/Intake: [phone]  Emergency Services: [phone]  [address], [address]   Same Day Access: [phone]  [facility] 18 [facility]  Same Day Access: [phone]  1315 [facility], [facility], [address] [facility] [address]    [place]  Rapid Access: [phone]   7007 [facility], [place], 225 [facility]  Outpatient therapy, private insurance and self pay, some Medicaid   [phone]  301 [facility], [place], [address]      [facility]  Outpatient therapy, medication management, testing    [address]  [id number] [address], [facility] 212 [address] [facility]  [phone]  612 [facility], [address] [place] [address], [address] [place] [address]  Outpatient therapy, community-based services, private insurance, [place], self-pay  [phone]  [facility], [place], [address]      [facility]   Outpatient therapy, community-based services, private insurance, [place], self-pay    [place]  [phone]  [facility]., [place], [address] [facility]    [place]   [phone]  800 [facility]. [place] [address]      2020 [facility]  Outpatient therapy, med management, private insurance, [facility]  [phone] [place] [address], [place], [address] [place] [address]  Psychological Testing, outpatient therapy, private insurance, [place]   [phone]  [address], [place], [address] Severo Golds  2900 N [facility], Latoya Tesfaye, 921 [facility]    2615 [facility]  [address] [place] [address], [place] johns, [address] [facility]    1202 S [facility]  100 [facility], 1000 [facility], [place], [address] [place] [address] Counseling and Wellness  Outpatient therapy, Medicaid, private insurance  [phone] 1500 [facility], [place], [facility] Bethfidelia HYDE [facility]  Outpatient therapy, Intensive Outpatient for Substance Abuse, Medicaid, private insurance  [phone], [place], [address]      201 [facility] (multiple locations)  Outpatient therapy, med management (some locations), self-pay and private insurance only    950 [facility] [address]  [phone] [facility], Yury Campos[address]  [phone]  2210 [facility], [address] [place] [address]  [phone] 2817 Javier Brown, [place] ([place])  21  [address], [place], [address]    [facility] ([address])  [phone]  [address], [place], [address]    [address]  [phone]  [address]63 [facility] [place], [address] [facility]  Outpatient therapy, med management, private insurance, self pay  [phone] 50 [address], Mohan Alvarez  Outpatient therapy, play therapy, art therapy, private insurance, some Medicaid   [phone]  [address] Jesi [address], [place], [address] [place] [address] Avenue      821 [facility]  Outpatient therapy, school-based therapy, immediate response, children only   [phone]  620 [facility], [place], [address]

## 2022-02-03 ENCOUNTER — OFFICE VISIT (OUTPATIENT)
Dept: NEUROLOGY | Age: 32
End: 2022-02-03
Payer: COMMERCIAL

## 2022-02-03 VITALS
DIASTOLIC BLOOD PRESSURE: 104 MMHG | SYSTOLIC BLOOD PRESSURE: 164 MMHG | RESPIRATION RATE: 20 BRPM | HEIGHT: 70 IN | BODY MASS INDEX: 38.6 KG/M2

## 2022-02-03 DIAGNOSIS — G43.709 CHRONIC MIGRAINE W/O AURA, NOT INTRACTABLE, W/O STAT MIGR: Primary | ICD-10-CM

## 2022-02-03 DIAGNOSIS — G47.12 IDIOPATHIC HYPERSOMNIA WITHOUT LONG SLEEP TIME: ICD-10-CM

## 2022-02-03 DIAGNOSIS — I66.03 MIDDLE CEREBRAL ARTERY STENOSIS, BILATERAL: ICD-10-CM

## 2022-02-03 DIAGNOSIS — G44.221 CHRONIC TENSION-TYPE HEADACHE, INTRACTABLE: ICD-10-CM

## 2022-02-03 PROCEDURE — 99245 OFF/OP CONSLTJ NEW/EST HI 55: CPT | Performed by: PSYCHIATRY & NEUROLOGY

## 2022-02-03 RX ORDER — ASPIRIN 81 MG/1
81 TABLET ORAL DAILY
Qty: 30 TABLET | Refills: 11
Start: 2022-02-03 | End: 2022-06-03

## 2022-02-03 RX ORDER — PROPRANOLOL HYDROCHLORIDE 60 MG/1
60 CAPSULE, EXTENDED RELEASE ORAL DAILY
Qty: 30 CAPSULE | Refills: 1 | Status: SHIPPED | OUTPATIENT
Start: 2022-02-03 | End: 2022-06-03

## 2022-02-03 RX ORDER — BUTALBITAL, ACETAMINOPHEN AND CAFFEINE 50; 325; 40 MG/1; MG/1; MG/1
1 TABLET ORAL
Qty: 40 TABLET | Refills: 0 | Status: SHIPPED | OUTPATIENT
Start: 2022-02-03 | End: 2022-06-03

## 2022-02-03 RX ORDER — AMITRIPTYLINE HYDROCHLORIDE 10 MG/1
TABLET, FILM COATED ORAL
Qty: 90 TABLET | Refills: 1 | Status: SHIPPED | OUTPATIENT
Start: 2022-02-03 | End: 2022-06-03

## 2022-02-03 NOTE — LETTER
2/4/2022    Patient: Eduarda Sexton   YOB: 1990   Date of Visit: 2/3/2022     Sharon Bowen MD  [facility] [address]  Via Fax: [phone]     Alexy Giang, [address] [place] [address]  Via In [facility] [address]    Dear MD Alexy Boucher MD,      Thank you for referring Ms. Eduarda Sexton to [address] [place] [address] for evaluation. My notes for this consultation are attached. If you have questions, please do not hesitate to call me. I look forward to following your patient along with you.       Sincerely,    Kuldeep Jacinto MD

## 2022-02-03 NOTE — PATIENT INSTRUCTIONS
Migraine Headache: Care Instructions  Overview     Migraines are painful, throbbing headaches that often start on one side of the head. They may cause nausea and vomiting and make you sensitive to light, sound, or smell. Without treatment, migraines can last from 4 hours to a few days. Medicines can help prevent migraines or stop them after they have started. Your doctor can help you find which ones work best for you. Follow-up care is a key part of your treatment and safety. Be sure to make and go to all appointments, and call your doctor if you are having problems. It's also a good idea to know your test results and keep a list of the medicines you take. How can you care for yourself at home? · Do not drive if you have taken a prescription pain medicine. · Rest in a quiet, dark room until your headache is gone. Close your eyes, and try to relax or go to sleep. Don't watch TV or read. · Put a cold, moist cloth or cold pack on the painful area for 10 to 20 minutes at a time. Put a thin cloth between the cold pack and your skin. · Use a warm, moist towel or a heating pad set on low to relax tight shoulder and neck muscles. · Have someone gently massage your neck and shoulders. · Take your medicines exactly as prescribed. Call your doctor if you think you are having a problem with your medicine. You will get more details on the specific medicines your doctor prescribes. · Don't take medicine for headache pain too often. Talk to your doctor if you are taking medicine more than 2 days a week to stop a headache. Taking too much pain medicine can lead to more headaches. These are called medicine-overuse headaches. To prevent migraines  · Keep a headache diary so you can figure out what triggers your headaches. Avoiding triggers may help you prevent headaches. Record when each headache began, how long it lasted, and what the pain was like.  Write down any other symptoms you had with the headache, such as nausea, flashing lights or dark spots, or sensitivity to bright light or loud noise. Note if the headache occurred near your period. List anything that might have triggered the headache. Triggers may include certain foods (chocolate, cheese, wine) or odors, smoke, bright light, stress, or lack of sleep. · If your doctor has prescribed medicine for your migraines, take it as directed. You may have medicine that you take only when you get a migraine and medicine that you take all the time to help prevent migraines. ? If your doctor has prescribed medicine for when you get a headache, take it at the first sign of a migraine, unless your doctor has given you other instructions. ? If your doctor has prescribed medicine to prevent migraines, take it exactly as prescribed. Call your doctor if you think you are having a problem with your medicine. · Find healthy ways to deal with stress. Migraines are most common during or right after stressful times. Try finding ways to reduce stress like practicing mindfulness or deep breathing exercises. · Get plenty of sleep and exercise. But be careful to not push yourself too hard during exercise. It may trigger a headache. · Eat meals on a regular schedule. Avoid foods and drinks that often trigger migraines. These include chocolate, alcohol (especially red wine and port), aspartame, monosodium glutamate (MSG), and some additives found in foods (such as hot dogs, rockwell, cold cuts, aged cheeses, and pickled foods). · Limit caffeine. Don't drink too much coffee, tea, or soda. But don't quit caffeine suddenly. That can also give you migraines. · Do not smoke or allow others to smoke around you. If you need help quitting, talk to your doctor about stop-smoking programs and medicines. These can increase your chances of quitting for good. · If you are taking birth control pills or hormone therapy, talk to your doctor about whether they are triggering your migraines.   When should you call for help? Call 911 anytime you think you may need emergency care. For example, call if:    · You have signs of a stroke. These may include:  ? Sudden numbness, paralysis, or weakness in your face, arm, or leg, especially on only one side of your body. ? Sudden vision changes. ? Sudden trouble speaking. ? Sudden confusion or trouble understanding simple statements. ? Sudden problems with walking or balance. ? A sudden, severe headache that is different from past headaches. Call your doctor now or seek immediate medical care if:    · You have new or worse nausea and vomiting.     · You have a new or higher fever.     · Your headache gets much worse. Watch closely for changes in your health, and be sure to contact your doctor if:    · You are not getting better after 2 days (48 hours). Where can you learn more? Go to http://www.gray.com/  Enter S448 in the search box to learn more about \"Migraine Headache: Care Instructions. \"  Current as of: April 8, 2021               Content Version: 13.0  © 4382-2906 [employer]. Care instructions adapted under license by [employer] (which disclaims liability or warranty for this information). If you have questions about a medical condition or this instruction, always ask your healthcare professional. [address] any warranty or liability for your use of this information. Tension Headache: Care Instructions  Overview  Most headaches are tension headaches. Some people get them often, especially if they have a lot of stress in their lives. This kind of headache may cause pain or a feeling of pressure all over your head. Sometimes it's hard to know where the center of the pain is. If you get a lot of these kind of headaches, the best way to reduce them is to find out what's causing them. Then you can make changes in those areas.   Follow-up care is a key part of your treatment and safety. Be sure to make and go to all appointments, and call your doctor if you are having problems. It's also a good idea to know your test results and keep a list of the medicines you take. How can you care for yourself at home? · Rest in a quiet, dark room. Put a cool cloth on your forehead. Close your eyes, and try to relax or go to sleep. Do not watch TV, read, or use the computer. · Use a warm, moist towel or a heating pad set on low to relax tight shoulder and neck muscles. · Have someone gently massage your neck and shoulders. · Be safe with medicines. Read and follow all instructions on the label. ? If the doctor gave you a prescription medicine for pain, take it as prescribed. ? If you are not taking a prescription pain medicine, ask your doctor if you can take an over-the-counter medicine. · Be careful not to take more pain medicine than the instructions say. This is because you may get worse or more frequent headaches when the medicine wears off. · If you get a headache, stop what you are doing and sit quietly for a moment. Close your eyes and breathe slowly. Try to relax your head and neck muscles. · Pay attention to any new symptoms you have when you have a headache. These include a fever, weakness or numbness, vision changes, or confusion. They may be signs of a more serious problem. To help prevent headaches  · Keep a headache diary. This can help you and your doctor figure out what triggers your headaches. If you avoid your triggers, you may be able to prevent headaches. · It's good to include several things in your headache diary. Write down when a headache begins and how long it lasts. Try to describe what the pain was like (throbbing, aching, stabbing, or dull). Then add anything you think may have triggered the headache. This could include stress, anxiety, or depression. It could also include hunger, anger, or fatigue.  Sometimes, bad posture and muscle strain are triggers for people. · Find healthy ways to deal with stress. Headaches are most common during or right after stressful times. Take time to relax before and after you do something that caused a headache in the past.  · Get plenty of exercise every day. Go for a walk or jog, ride a bike, or find other ways to be active. This can help with stress and muscle tension. · Get regular sleep. · Eat regularly and well. If you wait too long to eat, it can trigger a headache. · If you have the time and money, you may want to try massage. Some people find that regular massages really help relieve tension. · Try to use good posture and keep the muscles of your jaw, face, neck, and shoulders relaxed. If you sit at a desk, change positions often. Try to stretch for 30 seconds every hour. · If you use a computer a lot, you can do things to make your eyes less tired. Try blinking more and sometimes looking away from the screen. Be sure to use glasses or contacts if you need them. And check that your monitor is about an arm's distance away from you. When should you call for help? Call 911 anytime you think you may need emergency care. For example, call if:    · You have signs of a stroke. These may include:  ? Sudden numbness, paralysis, or weakness in your face, arm, or leg, especially on only one side of your body. ? Sudden vision changes. ? Sudden trouble speaking. ? Sudden confusion or trouble understanding simple statements. ? Sudden problems with walking or balance. ? A sudden, severe headache that is different from past headaches. Call your doctor now or seek immediate medical care if:    · You have new or worse nausea and vomiting.     · You have a new or higher fever.     · Your headache gets much worse. Watch closely for changes in your health, and be sure to contact your doctor if:    · You are not getting better after 2 days (48 hours). Where can you learn more?   Go to http://www.gray.com/  Enter C544 in the search box to learn more about \"Tension Headache: Care Instructions. \"  Current as of: April 8, 2021               Content Version: 13.0  © 3144-3240 [employer]. Care instructions adapted under license by [employer] (which disclaims liability or warranty for this information). If you have questions about a medical condition or this instruction, always ask your healthcare professional. [place] [address] any warranty or liability for your use of this information.

## 2022-03-09 ENCOUNTER — TELEPHONE (OUTPATIENT)
Dept: CARDIAC REHAB | Age: 32
End: 2022-03-09

## 2022-03-09 NOTE — TELEPHONE ENCOUNTER
3/9/2022 Cardiac Wellness: Called Ms. Sonia Slaughter to discuss outpatient nutrition appointment. Ms. Sonia Slaughter is without questions or concerns. Checked to make sure she had directions that I emailed to her as well.  Magalys Rivera

## 2022-03-18 PROBLEM — Z86.73 HISTORY OF TIA (TRANSIENT ISCHEMIC ATTACK): Status: ACTIVE | Noted: 2022-01-13

## 2022-05-20 ENCOUNTER — OFFICE VISIT (OUTPATIENT)
Dept: NEUROSURGERY | Age: 32
End: 2022-05-20
Payer: COMMERCIAL

## 2022-05-20 VITALS
SYSTOLIC BLOOD PRESSURE: 140 MMHG | OXYGEN SATURATION: 99 % | DIASTOLIC BLOOD PRESSURE: 98 MMHG | BODY MASS INDEX: 37.22 KG/M2 | WEIGHT: 260 LBS | HEIGHT: 70 IN | HEART RATE: 80 BPM | TEMPERATURE: 97.9 F

## 2022-05-20 DIAGNOSIS — Z84.0 FAMILY HISTORY OF LUPUS ERYTHEMATOSUS: ICD-10-CM

## 2022-05-20 DIAGNOSIS — F15.10 CAFFEINE ABUSE, CONTINUOUS (HCC): ICD-10-CM

## 2022-05-20 DIAGNOSIS — I66.03 MIDDLE CEREBRAL ARTERY STENOSIS, BILATERAL: ICD-10-CM

## 2022-05-20 DIAGNOSIS — I66.02 STENOSIS OF LEFT MIDDLE CEREBRAL ARTERY: Primary | ICD-10-CM

## 2022-05-20 DIAGNOSIS — Z86.73 HISTORY OF STROKE: ICD-10-CM

## 2022-05-20 DIAGNOSIS — Z86.73 HISTORY OF TIA (TRANSIENT ISCHEMIC ATTACK): ICD-10-CM

## 2022-05-20 PROCEDURE — 99205 OFFICE O/P NEW HI 60 MIN: CPT | Performed by: RADIOLOGY

## 2022-05-20 NOTE — H&P (VIEW-ONLY)
Neurointerventional Surgery New Outpatient Visit (History and Physical)    Patient: Skylar Zarco MRN: [id number]  SSN: [id number]    YOB: 1990  Age: 34 y.o. Sex: female      Subjective:      Skylar Zarco is a 34 y.o. female who was referred by Dr. Ariane Pemberton (neurology) for history of stroke, TIA, recurrent severe sudden onset headaches and bilateral MCA stenosis demonstrated on a CTA in December 2021. This patient reports 3 episodes of TIA in August 2021 that ultimately led to a hospitalization in [place] for stroke. The patient states that she had \"strokes on both sides of my brain\". These images and records are not available for direct review. The TIAs consisted of a severe sudden onset head pain that she describes as a \"squeezing\" where \"everything stopped\" and she became numb all over and disoriented. This episode resolved spontaneously but 2 days later she had a similar episode while driving where she lost feeling in both hands and then subsequently experience generalized weakness before resolution. During the third event, she lost feeling in her hands and fell into a car. She experienced generalized weakness and said that her \"words were not connected\". A coworker tracked her telephone and found her disoriented. She was taken to [facility] in [place] where stroke was diagnosed. He was discharged on Plavix but quit taking it. She is also supposed to be taking blood pressure meds but independently discontinued them because she does not like to take medicines. She takes aspirin and as needed for her severe headaches but usually has to lay down in a dark room. She endorses intermittent blurry vision but has not experienced diplopia. The patient stated that she thought these episodes are \"caused by stress\" and repeatedly stated that she is hyperemotional and tends to hold and frustration so much that she has to \"lay down in bed\".   Sometimes she experiences severe anger before these episodes. She also drinks Monster/Red Bull, sometimes 3 at a time. Does not use marijuana products. She denies smoking and drinks alcohol only occasionally. She has a family history of lupus erythematosus involving an aunt and a cousin. Aside from stroke, she has otherwise been healthy in her life but realizes that she has high blood pressure that is probably uncontrolled. She is not diabetic. Contracted COVID-19 in January 2022 and reports mild disease. 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 She is not vaccinated for COVID-19. She also reports being \"anemic my whole life\". She reports heavy menstrual periods \"always\". There is no family history of early onset stroke or atherosclerosis. Past Medical History:   Diagnosis Date    Anemia     Cerebral artery occlusion with cerebral infarction [facility]) 08/2021    Essential hypertension     Joint pain     Memory loss     Migraine     Muscle pain     Snoring     Stomach ulcer     TIA (transient ischemic attack)      History reviewed. No pertinent surgical history. Family History   Problem Relation Age of Onset    Hypertension Mother     Headache Mother     Diabetes Father     Hypertension Father     Headache Father     Dementia Maternal Grandmother     Dementia Maternal Grandfather      Social History     Tobacco Use    Smoking status: Never Smoker    Smokeless tobacco: Never Used   Substance Use Topics    Alcohol use: Yes     Comment: occ      Current Outpatient Medications   Medication Sig Dispense Refill    OTHER Reports taking OTC iron that is much stronger than prescribed.  aspirin delayed-release 81 mg tablet Take 1 Tablet by mouth daily. 30 Tablet 11    propranolol LA (INDERAL LA) 60 mg SR capsule Take 1 Capsule by mouth daily.  (Patient not taking: Reported on 5/20/2022) 30 Capsule 1    amitriptyline (ELAVIL) 10 mg tablet Take 1 tab at bedtime x 1 wk; then 2 tabs at bedtime x 1 wk; then 3 tabs at bedtime (Patient not taking: Reported on 5/20/2022) 90 Tablet 1    butalbital-acetaminophen-caffeine (FIORICET, ESGIC) -40 mg per tablet Take 1 Tablet by mouth every six (6) hours as needed for Headache. (Patient not taking: Reported on 5/20/2022) 40 Tablet 0    buPROPion XL (WELLBUTRIN XL) 150 mg tablet Take 1 Tablet by mouth daily. (Patient not taking: Reported on 5/20/2022) 30 Tablet 1    ferrous sulfate 325 mg (65 mg iron) tablet Take 325 mg by mouth. (Patient not taking: Reported on 5/20/2022)          No Known Allergies    Review of Systems:  A comprehensive review of systems was negative except for that written in the History of Present Illness. Objective:     Vitals:    05/20/22 1041   BP: (!) 140/98   Pulse: 80   Temp: 97.9 °F (36.6 °C)   TempSrc: Temporal   SpO2: 99%   Weight: 260 lb (117.9 kg)   Height: 5' 10\" (1.778 m)        Physical Exam:  GENERAL: alert, cooperative, no distress, appears stated age  THROAT & NECK: normal and no erythema or exudates noted. Vascular: No carotid bruits. LUNG: clear to auscultation bilaterally  HEART: regular rate and rhythm, S1, S2 normal, no murmur, click, rub or gallop  ABDOMEN: Obese, soft, non-tender. Bowel sounds normal. No masses,  no organomegaly  EXTREMITIES:  extremities normal, atraumatic, no cyanosis or edema  PSYCHIATRIC: anxious    Neurologic Exam:  Mental Status:  Alert and oriented x 4. Appropriate affect, mood and behavior. Language:    Normal fluency, repetition, comprehension and naming. Cranial Nerves:   Pupils equal, round and reactive to light. Visual fields full to confrontation. Extraocular movements intact. Facial sensation intact V1 - V3. Full facial strength, no asymmetry. Hearing intact bilaterally. No dysarthria.  Tongue protrudes to midline, palate elevates symmetrically. Shoulder shrug 5/5 bilaterally. Motor:    No pronator drift. Bulk and tone normal.      5/5 power in all extremities proximally and distally. No involuntary movements. Sensation:    Sensation intact throughout to light touch, no extinction. Reflexes:    Reflexes are 2+ at the biceps, triceps, patella and achilles bilaterally. Coordination & Gait: Normal.  But alternating movements, finger-to-nose and heel-to-shin maneuvers are normal.      Imaging:    I independently reviewed an MRI of the brain without and with contrast obtained on 12/11/2022, MRV of the head without contrast, CTA of the head and neck and CT of the head without contrast.  These demonstrate left greater than right segmental caliber changes in the MCA raising concern for a diffuse vasculopathy. The MRI shows no acute stroke but the work-up previously in August in [place] apparently showed bilateral strokes per patient report. These images are not available for direct comparison. The MRV demonstrates severe stenosis of the left distal transverse cerebral venous sinus. The dominant right cerebral venous system appears normal.  The cortical veins are normal.  On sagittal structural images, there is mild concavity of her pituitary gland. Subarachnoid spaces are normal.          Assessment and Plan:     59-year-old relatively healthy -American female with multiple TIAs and at least 1 episode of stroke over the past year with bilateral MCA segmental vasculopathy. Differential considerations include reversible cerebral vasoconstrictive syndrome, vasculitis and premature atherosclerosis. The patient is not diabetic. She reports excessive caffeine intake with energy drinks. Our [facility] is my main differential consideration. The next step is diagnostic cerebral angiography with verapamil challenge. This has been scheduled on May 27 at [facility] pending lab work.   Risk, benefits and alternatives to cerebral angiography and verapamil challenge were discussed in detail with the patient today. All her questions were answered. She wants to proceed. Written informed consent was obtained in the office. Patient understands that she will need medical care from her neurologist, Dr. Kennyth Klinefelter which will depend in part on the angiographic diagnosis. Thank you for this consult and participating in the care of this patient. I have discussed the diagnosis with the patient and the intended plan as seen in the above orders. Patient is in agreement.       Signed By: Tania Saunders MD     May 20, 2022

## 2022-05-20 NOTE — PATIENT INSTRUCTIONS
Diagnostic Angiogram on 5/ 26/2022 at [address] time 7:00am at Patient Registration. Blood work may be needed at least one week prior to procedure at a [facility].  No eating or drinking after midnight the night prior to Angiogram and take all morning medications with sips of water the morning of the angiogram.          Your history of stroke/transient ischemic attack and narrowing of both of your brain middle cerebral arteries is concerning. Definitive diagnosis is important to prevent stroke or brain bleeding in the future. Differential considerations include: Reversible cerebral vasoconstrictive syndrome (RCVS), vasculitis, and premature atherosclerosis (cholesterol/high blood pressure). The neck step for diagnosis is an outpatient procedure called a cerebral angiogram.  Infusion of verapamil, a medication to relax blood vessels, may be needed to make the diagnosis. Your procedure scheduled at [facility] on May 26. Take 650 mg of aspirin the night before the procedure.   Do not eat or drink anything after midnight.`

## 2022-05-20 NOTE — PROGRESS NOTES
New patient referred by Dr Fara Finch presenting with Middle Cerebral artery stenosis. Patient reports symptoms since TIA in August 2021. Reports daily headaches, occasional blurred or double vision, increased forgetfulness. Reports continued numbness in hands and feet. Reports her body becomes numb and she will freeze in place and cannot move. No acute problems reported.

## 2022-05-20 NOTE — PROGRESS NOTES
Neurointerventional Surgery New Outpatient Visit (History and Physical)    Patient: Jamey Marie MRN: [id number]  SSN: [id number]    YOB: 1990  Age: 34 y.o. Sex: female      Subjective:      Jamey Marie is a 34 y.o. female who was referred by Dr. Charu Cobb (neurology) for history of stroke, TIA, recurrent severe sudden onset headaches and bilateral MCA stenosis demonstrated on a CTA in December 2021. This patient reports 3 episodes of TIA in August 2021 that ultimately led to a hospitalization in [place] for stroke. The patient states that she had \"strokes on both sides of my brain\". These images and records are not available for direct review. The TIAs consisted of a severe sudden onset head pain that she describes as a \"squeezing\" where \"everything stopped\" and she became numb all over and disoriented. This episode resolved spontaneously but 2 days later she had a similar episode while driving where she lost feeling in both hands and then subsequently experience generalized weakness before resolution. During the third event, she lost feeling in her hands and fell into a car. She experienced generalized weakness and said that her \"words were not connected\". A coworker tracked her telephone and found her disoriented. She was taken to [facility] in [place] where stroke was diagnosed. He was discharged on Plavix but quit taking it. She is also supposed to be taking blood pressure meds but independently discontinued them because she does not like to take medicines. She takes aspirin and as needed for her severe headaches but usually has to lay down in a dark room. She endorses intermittent blurry vision but has not experienced diplopia. The patient stated that she thought these episodes are \"caused by stress\" and repeatedly stated that she is hyperemotional and tends to hold and frustration so much that she has to \"lay down in bed\".   Sometimes she experiences severe anger before these episodes. She also drinks Monster/Red Bull, sometimes 3 at a time. Does not use marijuana products. She denies smoking and drinks alcohol only occasionally. She has a family history of lupus erythematosus involving an aunt and a cousin. Aside from stroke, she has otherwise been healthy in her life but realizes that she has high blood pressure that is probably uncontrolled. She is not diabetic. Contracted COVID-19 in January 2022 and reports mild disease. 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 She is not vaccinated for COVID-19. She also reports being \"anemic my whole life\". She reports heavy menstrual periods \"always\". There is no family history of early onset stroke or atherosclerosis. Past Medical History:   Diagnosis Date    Anemia     Cerebral artery occlusion with cerebral infarction [facility]) 08/2021    Essential hypertension     Joint pain     Memory loss     Migraine     Muscle pain     Snoring     Stomach ulcer     TIA (transient ischemic attack)      History reviewed. No pertinent surgical history. Family History   Problem Relation Age of Onset    Hypertension Mother     Headache Mother     Diabetes Father     Hypertension Father     Headache Father     Dementia Maternal Grandmother     Dementia Maternal Grandfather      Social History     Tobacco Use    Smoking status: Never Smoker    Smokeless tobacco: Never Used   Substance Use Topics    Alcohol use: Yes     Comment: occ      Current Outpatient Medications   Medication Sig Dispense Refill    OTHER Reports taking OTC iron that is much stronger than prescribed.  aspirin delayed-release 81 mg tablet Take 1 Tablet by mouth daily. 30 Tablet 11    propranolol LA (INDERAL LA) 60 mg SR capsule Take 1 Capsule by mouth daily.  (Patient not taking: Reported on 5/20/2022) 30 Capsule 1    amitriptyline (ELAVIL) 10 mg tablet Take 1 tab at bedtime x 1 wk; then 2 tabs at bedtime x 1 wk; then 3 tabs at bedtime (Patient not taking: Reported on 5/20/2022) 90 Tablet 1    butalbital-acetaminophen-caffeine (FIORICET, ESGIC) -40 mg per tablet Take 1 Tablet by mouth every six (6) hours as needed for Headache. (Patient not taking: Reported on 5/20/2022) 40 Tablet 0    buPROPion XL (WELLBUTRIN XL) 150 mg tablet Take 1 Tablet by mouth daily. (Patient not taking: Reported on 5/20/2022) 30 Tablet 1    ferrous sulfate 325 mg (65 mg iron) tablet Take 325 mg by mouth. (Patient not taking: Reported on 5/20/2022)          No Known Allergies    Review of Systems:  A comprehensive review of systems was negative except for that written in the History of Present Illness. Objective:     Vitals:    05/20/22 1041   BP: (!) 140/98   Pulse: 80   Temp: 97.9 °F (36.6 °C)   TempSrc: Temporal   SpO2: 99%   Weight: 260 lb (117.9 kg)   Height: 5' 10\" (1.778 m)        Physical Exam:  GENERAL: alert, cooperative, no distress, appears stated age  THROAT & NECK: normal and no erythema or exudates noted. Vascular: No carotid bruits. LUNG: clear to auscultation bilaterally  HEART: regular rate and rhythm, S1, S2 normal, no murmur, click, rub or gallop  ABDOMEN: Obese, soft, non-tender. Bowel sounds normal. No masses,  no organomegaly  EXTREMITIES:  extremities normal, atraumatic, no cyanosis or edema  PSYCHIATRIC: anxious    Neurologic Exam:  Mental Status:  Alert and oriented x 4. Appropriate affect, mood and behavior. Language:    Normal fluency, repetition, comprehension and naming. Cranial Nerves:   Pupils equal, round and reactive to light. Visual fields full to confrontation. Extraocular movements intact. Facial sensation intact V1 - V3. Full facial strength, no asymmetry. Hearing intact bilaterally. No dysarthria.  Tongue protrudes to midline, palate elevates symmetrically. Shoulder shrug 5/5 bilaterally. Motor:    No pronator drift. Bulk and tone normal.      5/5 power in all extremities proximally and distally. No involuntary movements. Sensation:    Sensation intact throughout to light touch, no extinction. Reflexes:    Reflexes are 2+ at the biceps, triceps, patella and achilles bilaterally. Coordination & Gait: Normal.  But alternating movements, finger-to-nose and heel-to-shin maneuvers are normal.      Imaging:    I independently reviewed an MRI of the brain without and with contrast obtained on 12/11/2022, MRV of the head without contrast, CTA of the head and neck and CT of the head without contrast.  These demonstrate left greater than right segmental caliber changes in the MCA raising concern for a diffuse vasculopathy. The MRI shows no acute stroke but the work-up previously in August in [place] apparently showed bilateral strokes per patient report. These images are not available for direct comparison. The MRV demonstrates severe stenosis of the left distal transverse cerebral venous sinus. The dominant right cerebral venous system appears normal.  The cortical veins are normal.  On sagittal structural images, there is mild concavity of her pituitary gland. Subarachnoid spaces are normal.          Assessment and Plan:     68-year-old relatively healthy -American female with multiple TIAs and at least 1 episode of stroke over the past year with bilateral MCA segmental vasculopathy. Differential considerations include reversible cerebral vasoconstrictive syndrome, vasculitis and premature atherosclerosis. The patient is not diabetic. She reports excessive caffeine intake with energy drinks. Our CVS is my main differential consideration. The next step is diagnostic cerebral angiography with verapamil challenge. This has been scheduled on May 27 at [facility] pending lab work.   Risk, benefits and alternatives to cerebral angiography and verapamil challenge were discussed in detail with the patient today. All her questions were answered. She wants to proceed. Written informed consent was obtained in the office. Patient understands that she will need medical care from her neurologist, Dr. Roly Tucker which will depend in part on the angiographic diagnosis. Thank you for this consult and participating in the care of this patient. I have discussed the diagnosis with the patient and the intended plan as seen in the above orders. Patient is in agreement.       Signed By: Vipin Rowe MD     May 20, 2022

## 2022-05-21 LAB
ALBUMIN SERPL-MCNC: 4.4 G/DL (ref 3.8–4.8)
ALBUMIN/GLOB SERPL: 1.3 {RATIO} (ref 1.2–2.2)
ALP SERPL-CCNC: 99 IU/L (ref 44–121)
ALT SERPL-CCNC: 14 IU/L (ref 0–32)
AST SERPL-CCNC: 19 IU/L (ref 0–40)
BILIRUB SERPL-MCNC: 0.3 MG/DL (ref 0–1.2)
BUN SERPL-MCNC: 9 MG/DL (ref 6–20)
BUN/CREAT SERPL: 11 (ref 9–23)
CALCIUM SERPL-MCNC: 8.9 MG/DL (ref 8.7–10.2)
CHLORIDE SERPL-SCNC: 104 MMOL/L (ref 96–106)
CO2 SERPL-SCNC: 20 MMOL/L (ref 20–29)
CREAT SERPL-MCNC: 0.79 MG/DL (ref 0.57–1)
EGFR: 101 ML/MIN/1.73
ERYTHROCYTE [DISTWIDTH] IN BLOOD BY AUTOMATED COUNT: 19 % (ref 11.7–15.4)
GLOBULIN SER CALC-MCNC: 3.5 G/DL (ref 1.5–4.5)
GLUCOSE SERPL-MCNC: 83 MG/DL (ref 65–99)
HCT VFR BLD AUTO: 28.6 % (ref 34–46.6)
HGB BLD-MCNC: 8.6 G/DL (ref 11.1–15.9)
MCH RBC QN AUTO: 21.9 PG (ref 26.6–33)
MCHC RBC AUTO-ENTMCNC: 30.1 G/DL (ref 31.5–35.7)
MCV RBC AUTO: 73 FL (ref 79–97)
PLATELET # BLD AUTO: 330 X10E3/UL (ref 150–450)
POTASSIUM SERPL-SCNC: 4.2 MMOL/L (ref 3.5–5.2)
PROT SERPL-MCNC: 7.9 G/DL (ref 6–8.5)
RBC # BLD AUTO: 3.92 X10E6/UL (ref 3.77–5.28)
SODIUM SERPL-SCNC: 139 MMOL/L (ref 134–144)
WBC # BLD AUTO: 5.3 X10E3/UL (ref 3.4–10.8)

## 2022-05-23 ENCOUNTER — DOCUMENTATION ONLY (OUTPATIENT)
Dept: NEUROSURGERY | Age: 32
End: 2022-05-23

## 2022-05-23 ENCOUNTER — TELEPHONE (OUTPATIENT)
Dept: FAMILY MEDICINE CLINIC | Age: 32
End: 2022-05-23

## 2022-05-23 NOTE — TELEPHONE ENCOUNTER
----- Message from Symsonia Dustin sent at 5/23/2022  9:12 AM EDT -----  Subject: Message to Provider    QUESTIONS  Information for Provider? Patient is requesting to schedule a TB test for   school. She is requesting to schedule the appointment as soon as possible. Patient can be reached at [phone]  ---------------------------------------------------------------------------  --------------  [address]  What is the best way for the office to contact you? OK to leave message on   voicemail  Preferred Call Back Phone Number? [phone]  ---------------------------------------------------------------------------  --------------  SCRIPT ANSWERS  Relationship to Patient?  Self

## 2022-05-25 ENCOUNTER — TELEPHONE (OUTPATIENT)
Dept: NEUROSURGERY | Age: 32
End: 2022-05-25

## 2022-05-25 NOTE — TELEPHONE ENCOUNTER
Spoke to patient to confirm Diagnostic angiogram tomorrow at [facility]. Arrival time 7:00 am at Out patient registration. Informed patient due to CBC lab results, per provider take 325 mg aspirin tonight, no eating or drinking after midnight, take morning medications with sips of water. Confirmed NKA and that patient is not diabetic. Patient stated understanding.

## 2022-05-26 ENCOUNTER — DOCUMENTATION ONLY (OUTPATIENT)
Dept: NEUROSURGERY | Age: 32
End: 2022-05-26

## 2022-05-26 ENCOUNTER — HOSPITAL ENCOUNTER (OUTPATIENT)
Dept: INTERVENTIONAL RADIOLOGY/VASCULAR | Age: 32
Discharge: HOME | End: 2022-05-26
Attending: RADIOLOGY | Admitting: RADIOLOGY
Payer: COMMERCIAL

## 2022-05-26 VITALS
HEART RATE: 70 BPM | WEIGHT: 260 LBS | TEMPERATURE: 98.9 F | SYSTOLIC BLOOD PRESSURE: 147 MMHG | DIASTOLIC BLOOD PRESSURE: 89 MMHG | HEIGHT: 70 IN | OXYGEN SATURATION: 100 % | BODY MASS INDEX: 37.22 KG/M2 | RESPIRATION RATE: 18 BRPM

## 2022-05-26 DIAGNOSIS — I66.02 STENOSIS OF LEFT MIDDLE CEREBRAL ARTERY: ICD-10-CM

## 2022-05-26 LAB
CHOLEST SERPL-MCNC: 121 MG/DL
HCG UR QL: NEGATIVE
HDLC SERPL-MCNC: 47 MG/DL
HDLC SERPL: 2.6 {RATIO} (ref 0–5)
LDLC SERPL CALC-MCNC: 57.4 MG/DL (ref 0–100)
TRIGL SERPL-MCNC: 83 MG/DL (ref ?–150)
VLDLC SERPL CALC-MCNC: 16.6 MG/DL

## 2022-05-26 PROCEDURE — 77030008584 HC TOOL GDWRE DEV TERU -A

## 2022-05-26 PROCEDURE — 77030012468 HC VLV BLEEDBK CNTRL ABBT -B

## 2022-05-26 PROCEDURE — 2709999900 HC NON-CHARGEABLE SUPPLY

## 2022-05-26 PROCEDURE — C1894 INTRO/SHEATH, NON-LASER: HCPCS

## 2022-05-26 PROCEDURE — C1887 CATHETER, GUIDING: HCPCS

## 2022-05-26 PROCEDURE — G0269 OCCLUSIVE DEVICE IN VEIN ART: HCPCS

## 2022-05-26 PROCEDURE — 81025 URINE PREGNANCY TEST: CPT

## 2022-05-26 PROCEDURE — 80061 LIPID PANEL: CPT

## 2022-05-26 PROCEDURE — 74011250637 HC RX REV CODE- 250/637: Performed by: RADIOLOGY

## 2022-05-26 PROCEDURE — 36415 COLL VENOUS BLD VENIPUNCTURE: CPT

## 2022-05-26 PROCEDURE — 77030008638 HC TU CONN COOK -A

## 2022-05-26 PROCEDURE — 74011250636 HC RX REV CODE- 250/636: Performed by: RADIOLOGY

## 2022-05-26 PROCEDURE — 77030003394 HC NDL ART COOK -A

## 2022-05-26 PROCEDURE — 74011000250 HC RX REV CODE- 250: Performed by: RADIOLOGY

## 2022-05-26 PROCEDURE — C1769 GUIDE WIRE: HCPCS

## 2022-05-26 RX ORDER — SODIUM CHLORIDE 9 MG/ML
25 INJECTION, SOLUTION INTRAVENOUS CONTINUOUS
Status: DISCONTINUED | OUTPATIENT
Start: 2022-05-26 | End: 2022-05-26

## 2022-05-26 RX ORDER — LIDOCAINE HYDROCHLORIDE 20 MG/ML
10 INJECTION, SOLUTION INFILTRATION; PERINEURAL ONCE
Status: DISCONTINUED | OUTPATIENT
Start: 2022-05-26 | End: 2022-05-26

## 2022-05-26 RX ORDER — LABETALOL HCL 20 MG/4 ML
10 SYRINGE (ML) INTRAVENOUS
Status: COMPLETED | OUTPATIENT
Start: 2022-05-26 | End: 2022-05-26

## 2022-05-26 RX ORDER — LIDOCAINE HYDROCHLORIDE AND EPINEPHRINE 10; 10 MG/ML; UG/ML
10 INJECTION, SOLUTION INFILTRATION; PERINEURAL ONCE
Status: DISCONTINUED | OUTPATIENT
Start: 2022-05-26 | End: 2022-05-26

## 2022-05-26 RX ORDER — ACETAMINOPHEN 500 MG
1000 TABLET ORAL
Status: COMPLETED | OUTPATIENT
Start: 2022-05-26 | End: 2022-05-26

## 2022-05-26 RX ORDER — VERAPAMIL HYDROCHLORIDE 2.5 MG/ML
2.5 INJECTION, SOLUTION INTRAVENOUS
Status: COMPLETED | OUTPATIENT
Start: 2022-05-26 | End: 2022-05-26

## 2022-05-26 RX ORDER — MIDAZOLAM HYDROCHLORIDE 1 MG/ML
.5-5 INJECTION, SOLUTION INTRAMUSCULAR; INTRAVENOUS
Status: DISCONTINUED | OUTPATIENT
Start: 2022-05-26 | End: 2022-05-26

## 2022-05-26 RX ORDER — NALOXONE HYDROCHLORIDE 1 MG/ML
0.4 INJECTION INTRAMUSCULAR; INTRAVENOUS; SUBCUTANEOUS
Status: DISCONTINUED | OUTPATIENT
Start: 2022-05-26 | End: 2022-05-26

## 2022-05-26 RX ORDER — LANOLIN ALCOHOL/MO/W.PET/CERES
400 CREAM (GRAM) TOPICAL
Status: COMPLETED | OUTPATIENT
Start: 2022-05-26 | End: 2022-05-26

## 2022-05-26 RX ORDER — FLUMAZENIL 0.1 MG/ML
0.5 INJECTION INTRAVENOUS
Status: DISCONTINUED | OUTPATIENT
Start: 2022-05-26 | End: 2022-05-26

## 2022-05-26 RX ORDER — HEPARIN SODIUM 1000 [USP'U]/ML
2000 INJECTION, SOLUTION INTRAVENOUS; SUBCUTANEOUS
Status: COMPLETED | OUTPATIENT
Start: 2022-05-26 | End: 2022-05-26

## 2022-05-26 RX ORDER — FENTANYL CITRATE 50 UG/ML
12.5-2 INJECTION, SOLUTION INTRAMUSCULAR; INTRAVENOUS
Status: DISCONTINUED | OUTPATIENT
Start: 2022-05-26 | End: 2022-05-26

## 2022-05-26 RX ADMIN — ACETAMINOPHEN 1000 MG: 500 TABLET ORAL at 10:49

## 2022-05-26 RX ADMIN — Medication 4000 UNITS: at 09:36

## 2022-05-26 RX ADMIN — Medication 4000 UNITS: at 09:37

## 2022-05-26 RX ADMIN — NITROGLYCERIN 200 MCG: 10 INJECTION INTRAVENOUS at 09:38

## 2022-05-26 RX ADMIN — MIDAZOLAM HYDROCHLORIDE 1 MG: 1 INJECTION, SOLUTION INTRAMUSCULAR; INTRAVENOUS at 09:26

## 2022-05-26 RX ADMIN — Medication 4000 UNITS: at 09:35

## 2022-05-26 RX ADMIN — Medication 4000 UNITS: at 09:38

## 2022-05-26 RX ADMIN — VERAPAMIL HYDROCHLORIDE 2.5 MG: 2.5 INJECTION, SOLUTION INTRAVENOUS at 09:38

## 2022-05-26 RX ADMIN — LABETALOL HYDROCHLORIDE 10 MG: 5 INJECTION INTRAVENOUS at 11:05

## 2022-05-26 RX ADMIN — Medication 400 MG: at 10:49

## 2022-05-26 RX ADMIN — HEPARIN SODIUM 2000 UNITS: 1000 INJECTION INTRAVENOUS; SUBCUTANEOUS at 09:38

## 2022-05-26 RX ADMIN — FENTANYL CITRATE 50 MCG: 50 INJECTION, SOLUTION INTRAMUSCULAR; INTRAVENOUS at 09:44

## 2022-05-26 RX ADMIN — SODIUM CHLORIDE 25 ML/HR: 9 INJECTION, SOLUTION INTRAVENOUS at 09:13

## 2022-05-26 RX ADMIN — LABETALOL HYDROCHLORIDE 10 MG: 5 INJECTION INTRAVENOUS at 11:32

## 2022-05-26 RX ADMIN — FENTANYL CITRATE 50 MCG: 50 INJECTION, SOLUTION INTRAMUSCULAR; INTRAVENOUS at 09:26

## 2022-05-26 NOTE — ROUTINE PROCESS
Pt arrives ambulatory to angio department accompanied by ankur for diagnostic cerebral angiogram procedure. All assessments completed and consent was reviewed. Education given was regarding procedure, conscious sedation, post-procedure care and  management/follow-up. Opportunity for questions was provided and all questions and concerns were addressed.

## 2022-05-26 NOTE — DISCHARGE INSTRUCTIONS
[address] [facility],[address]  Cerebrovascular and Stroke Center            Cerebral Angiography Discharge Instructions    General Information: This test is done to evaluate the blood vessels in your brain and neck. Following the procedure, you will be asked to lie flat on your back for 2-6 hours after the procedure to prevent bleeding complications. The physician may use an arterial closure device that will decrease your recovery time. If this is used, your nurse will explain the difference in recovery. We have no way to determine if we can use a closure device until the procedure is started. We will let you know when you wake up after the procedure. Home Care Instructions: You can resume your regular diet. Do not bathe, swim, drink alcohol, or make any important legal decisions in the next 48 hours. You may drive after 24 hours. Do not lift anything heavier than a gallon of milk for 2 days. Watch the site carefully for signs of infection, like fever, drainage, redness, and/or swelling. We recommend restarting your Propanolol, blood pressure medicine, and following up with your PCP to get your high blood pressure under control. Check the patient portal in 1 week to review your cholesterol levels and share your results with your PCP. We also recommend losing about 30lbs to help with your cholesterol levels as well as your high blood pressure. Call If: You should call your Physician and/or the Radiology Nurse if you have any signs of infection like fever, drainage, redness, and/or swelling. If the puncture site should ooze, please call. Also call if you have any pain, decreased sensation, numbness, tingling, swelling, or change in color to the affected extremity. SEEK IMMEDIATE MEDICAL CARE IF YOUR PUNCTURE SITE STARTS TO BLEED. APPLY ENOUGH FIRM PRESSURE TO THE SITE WITH THE TIPS OF YOUR FINGERS TO STOP THE BLEEDING.   Arterial bleeding is a medical emergency and should be evaluated immediately. Follow-Up Instructions:  Please follow up with your ordering doctor as he/she has requested. To Reach Us:  Side effects of sedation medications and other medications used today have been reviewed. Notify us of nausea, itching, hives, dizziness, or anything else out of the ordinary. Should you experience any of these significant changes, please call [phone] between the hours of 7:30 am and 10 pm or [phone] after hours.  After hours, ask the  to page the [address] Technologist, and describe the problem to the technologist.

## 2022-05-26 NOTE — PROGRESS NOTES
Name of procedure:  cerebral angiogram    Sedation medications given: fentanyl 100mcg, versed 1mg     Sedation tolerated: well     Total Sedation time: 30min     Vital Signs: HTN, 20mg labetalol given post procedure     Any complications related to procedure: see orders     Post Procedure Care Needed/order sets placed in connect care: pt will follow up with PCP to review HTN and cholesterol management. Pt tolerated procedure well. VSS. No C/O pain. Dressing to site D&I. No bleeding or hematoma noted to site. IV D/Cd. Discharge instructions given. Copy on chart and copy given to pt. Pt verbalized understanding. Pt taken to car by wheelchair and taken home by family. NAD noted at time of discharge.

## 2022-05-26 NOTE — BRIEF OP NOTE
NEUROINTERVENTIONAL SURGERY POST-PROCEDURE NOTE                PROCEDURE:  Radial cerebral angiogram  US guided right radial artery access    VESSEL(S) STUDIED:  1. LCCA  2. LICA  3. RCCA  4. ABBI  5. RVA VESSEL(S) TREATED:  1. none        PRELIMINARY REPORT & DISPOSITION:     60% tapered left M1 stenosis, premature arteriosclerosis. Minimal right MCA M1 arteriosclerosis with no significant stenosis. No diffuse vasculopathy to suggest RCVS or vasculitis. Normal cervical vessels. COMPLICATIONS:  None immediate    FOLLOW-UP:  Outpatient with Dr. Julia Villafuerte (neurology)  BP goal < 130/85 DATE OF SERVICE:  5/26/2022 10:46 AM     ATTENDING SURGEON(S):  Getachew Dyson MD      ANESTHESIA:   Nursing sedation    EBL: < 1 cc    MEDICATIONS:   See nursing record    PUNCTURE SITE:  Right radial artery.   Arteriotomy closed with VascBand

## 2022-05-26 NOTE — INTERVAL H&P NOTE
Update History & Physical    The Patient's History and Physical of {22 1100 am,   H & P was reviewed with the patient and I examined the patient. There was no change in plan of care  The surgical site was confirmed by the patient and me. Plan:  The risk, benefits, expected outcome, and alternative to the recommended procedure have been discussed with the patient. Patient understands and wants to proceed with the procedure: Diagnostic cerebral angiogram with verapamil challenge. HEART: regular rate and rhythm, S1, S2 normal, no murmur, click, rub or gallop   LUNG: clear to auscultation bilaterally     Neurologic Exam:  Mental Status:  Alert and oriented x 4. Fully attentive. Appropriate affect, mood and behavior. Language:    Normal fluency, repetition, comprehension and naming. Cranial Nerves:   Pupils equal, round and reactive to light. Visual fields normal in all quadrants in both eyes. Extraocular movements intact w/o nystagmus      Facial sensation intact to LT and symmetric, no extinction. Full facial strength, no asymmetry. Hearing intact bilaterally. No dysarthria. Tongue protrudes to midline, palate elevates symmetrically. Shoulder shrug 5/5 bilaterally. Motor:    Bulk and tone normal.      5/5 strength in all extremities proximally and distally. No pronator drift. No involuntary movements. Sensation:    Sensation intact throughout to light touch, no extinction. Coordination & Gait: No ataxia with finger to nose and heel to shin testing. Gait deferred.     NIHSS:      1a-LOC:0    1b-Month/Age:0    1c-Open/Close Hand:0    2-Best Gaze:0    3-Visual Fields:0    4-Facial Palsy:0    5a-Left Arm:0    5b-Right Arm:0    6a-Left Le    6b-Right Le    7-Limb Ataxia:0    8-Sensory:0    9-Best Language:0    10-Dysarthria:0    11-Extinction/Inattention:0  TOTAL SCORE: 0    Electronically signed by Juan Carlos Urias NP on 2022 at 9:06 AM

## 2022-05-27 ENCOUNTER — TELEPHONE (OUTPATIENT)
Dept: NEUROLOGY | Age: 32
End: 2022-05-27

## 2022-06-01 ENCOUNTER — LAB ONLY (OUTPATIENT)
Dept: FAMILY MEDICINE CLINIC | Age: 32
End: 2022-06-01

## 2022-06-01 DIAGNOSIS — Z11.1 SCREENING FOR TUBERCULOSIS: Primary | ICD-10-CM

## 2022-06-02 ENCOUNTER — TELEPHONE (OUTPATIENT)
Dept: NEUROSURGERY | Age: 32
End: 2022-06-02

## 2022-06-02 ENCOUNTER — DOCUMENTATION ONLY (OUTPATIENT)
Dept: NEUROSURGERY | Age: 32
End: 2022-06-02

## 2022-06-02 NOTE — PROGRESS NOTES
Provider cancelled tomorrows appointment. Patient was informed after angiogram that no follow up with [facility] was needed. She was referred back to Neurology by provider. Patient was notified at angiogram of cancellation.

## 2022-06-03 ENCOUNTER — OFFICE VISIT (OUTPATIENT)
Dept: FAMILY MEDICINE CLINIC | Age: 32
End: 2022-06-03
Payer: COMMERCIAL

## 2022-06-03 VITALS
TEMPERATURE: 98.4 F | SYSTOLIC BLOOD PRESSURE: 137 MMHG | BODY MASS INDEX: 37.8 KG/M2 | DIASTOLIC BLOOD PRESSURE: 81 MMHG | WEIGHT: 264 LBS | HEART RATE: 77 BPM | HEIGHT: 70 IN | OXYGEN SATURATION: 98 % | RESPIRATION RATE: 18 BRPM

## 2022-06-03 DIAGNOSIS — I66.03 MIDDLE CEREBRAL ARTERY STENOSIS, BILATERAL: ICD-10-CM

## 2022-06-03 DIAGNOSIS — I70.90 ATHEROSCLEROSIS: ICD-10-CM

## 2022-06-03 DIAGNOSIS — I10 PRIMARY HYPERTENSION: Primary | ICD-10-CM

## 2022-06-03 PROBLEM — F33.9 RECURRENT MAJOR DEPRESSIVE DISORDER (HCC): Status: RESOLVED | Noted: 2022-06-03 | Resolved: 2022-06-03

## 2022-06-03 PROBLEM — F33.9 RECURRENT MAJOR DEPRESSIVE DISORDER (HCC): Status: ACTIVE | Noted: 2022-06-03

## 2022-06-03 PROBLEM — I63.512 CEREBRAL INFARCTION DUE TO STENOSIS OF LEFT MIDDLE CEREBRAL ARTERY (HCC): Status: ACTIVE | Noted: 2022-06-03

## 2022-06-03 PROCEDURE — 99214 OFFICE O/P EST MOD 30 MIN: CPT | Performed by: STUDENT IN AN ORGANIZED HEALTH CARE EDUCATION/TRAINING PROGRAM

## 2022-06-03 RX ORDER — PRAVASTATIN SODIUM 40 MG/1
40 TABLET ORAL
Qty: 30 TABLET | Refills: 0 | Status: SHIPPED | OUTPATIENT
Start: 2022-06-03 | End: 2022-06-29

## 2022-06-03 RX ORDER — ASPIRIN 81 MG/1
81 TABLET ORAL DAILY
Qty: 30 TABLET | Refills: 0 | Status: SHIPPED | OUTPATIENT
Start: 2022-06-03 | End: 2022-06-29

## 2022-06-03 RX ORDER — AMLODIPINE BESYLATE 5 MG/1
5 TABLET ORAL DAILY
Qty: 30 TABLET | Refills: 0 | Status: SHIPPED | OUTPATIENT
Start: 2022-06-03 | End: 2022-06-29

## 2022-06-03 NOTE — PROGRESS NOTES
Chief Complaint   Patient presents with    Medication Evaluation     Patient states she wants to discuss blood pressure and cholesterol medications. Patient feels that Bupropion low dose is not helpling     Visit Vitals  /81 (BP 1 Location: Left arm, BP Patient Position: Sitting, BP Cuff Size: Adult)   Pulse 77   Temp 98.4 °F (36.9 °C) (Temporal)   Resp 18   Ht 5' 10\" (1.778 m)   Wt 264 lb (119.7 kg)   SpO2 98%   BMI 37.88 kg/m²     1. Have you been to the ER, urgent care clinic since your last visit? Hospitalized since your last visit? No    2. Have you seen or consulted any other health care providers outside of the [address] [place] [address] since your last visit? Include any pap smears or colon screening.  No

## 2022-06-03 NOTE — PROGRESS NOTES
[address] [facility] [address] [place] [address]   Office [phone], Fax [phone]    Subjective:     Chief Complaint   Patient presents with    Medication Evaluation     Patient states she wants to discuss blood pressure and cholesterol medications. Patient feels that Bupropion low dose is not helpling     History provided by patient     HPI:  Ariana Villegas is a 34 y.o. BLACK/  TWO OR MORE RACES female with medical history of TIA and Middle Cerebral Artery Stenosis presents for   Chief Complaint   Patient presents with    Medication Evaluation     Patient states she wants to discuss blood pressure and cholesterol medications. Patient feels that Bupropion low dose is not helpling       Ms. Kan is seen in clinic to discuss blood pressure and cholesterol management. She has a history of TIA without evidence of stroke on imaging. She underwent Carotid and Cerebral Angiogram with Neurointerventional at [facility] and was found to have 60% tapered L MCA M1 stenosis by premature arteriolosclerosis, minimal arteriosclerotic changes in the R M1 with no significant stenosis. She was asked to follow up with Dr. Renetta Flores (Neurology) and to keep her BP below 130/85.     Social History     Socioeconomic History    Marital status: SINGLE     Spouse name: Not on file    Number of children: Not on file    Years of education: Not on file    Highest education level: Not on file   Occupational History    Not on file   Tobacco Use    Smoking status: Never Smoker    Smokeless tobacco: Never Used   Vaping Use    Vaping Use: Never used   Substance and Sexual Activity    Alcohol use: Yes     Comment: occ    Drug use: Not Currently    Sexual activity: Yes     Partners: Male   Other Topics Concern    Not on file   Social History Narrative    Not on file     Social Determinants of Health     Financial Resource Strain:     Difficulty of Paying Living Expenses: Not on file   Food Insecurity:     Worried About Running Out of Food in the Last Year: Not on file    Ran Out of Food in the Last Year: Not on file   Transportation Needs:     Lack of Transportation (Medical): Not on file    Lack of Transportation (Non-Medical): Not on file   Physical Activity:     Days of Exercise per Week: Not on file    Minutes of Exercise per Session: Not on file   Stress:     Feeling of Stress : Not on file   Social Connections:     Frequency of Communication with Friends and Family: Not on file    Frequency of Social Gatherings with Friends and Family: Not on file    Attends Church Services: Not on file    Active Member of [address] [place] [address] [employer] or Organizations: Not on file    Attends Club or Organization Meetings: Not on file    Marital Status: Not on file   Intimate Partner Violence:     Fear of Current or Ex-Partner: Not on file    Emotionally Abused: Not on file    Physically Abused: Not on file    Sexually Abused: Not on file   Housing Stability:     Unable to Pay for Housing in the Last Year: Not on file    Number of [place] in the Last Year: Not on file    Unstable Housing in the Last Year: Not on file     Review of Systems   Constitutional: Negative for chills and fever. Respiratory: Negative for cough and shortness of breath. Cardiovascular: Negative for chest pain and leg swelling. Gastrointestinal: Negative for abdominal pain, constipation, diarrhea, nausea and vomiting. Genitourinary: Negative for dysuria, frequency and urgency. Skin: Negative for itching and rash. Neurological: Negative for dizziness and headaches. Objective:     Visit Vitals  /81 (BP 1 Location: Left arm, BP Patient Position: Sitting, BP Cuff Size: Adult)   Pulse 77   Temp 98.4 °F (36.9 °C) (Temporal)   Resp 18   Ht 5' 10\" (1.778 m)   Wt 264 lb (119.7 kg)   LMP 05/31/2022   SpO2 98%   BMI 37.88 kg/m²      Physical Exam  Constitutional:       Appearance: Normal appearance. HENT:      Head: Normocephalic and atraumatic. Cardiovascular:      Rate and Rhythm: Normal rate and regular rhythm. Pulses: Normal pulses. Heart sounds: Normal heart sounds. Pulmonary:      Effort: Pulmonary effort is normal.      Breath sounds: Normal breath sounds. Abdominal:      General: Abdomen is flat. Bowel sounds are normal.      Palpations: Abdomen is soft. Skin:     General: Skin is warm and dry. Neurological:      General: No focal deficit present. Mental Status: She is alert and oriented to person, place, and time. Assessment and orders:       ICD-10-CM ICD-9-CM    1. Primary hypertension  I10 401.9 amLODIPine (NORVASC) 5 mg tablet   2. Atherosclerosis  I70.90 440.9 pravastatin (PRAVACHOL) 40 mg tablet   3. Middle cerebral artery stenosis, bilateral  I66.03 437.0 aspirin delayed-release 81 mg tablet      pravastatin (PRAVACHOL) 40 mg tablet     1. HTN: BP today 137/81, goal due to middle cerebral artery stenosis is less than 130/85  - Will start on Amlodipine 5mg daily and titrate as needed    2. Middle Cerebral Artery Stenosis: personally reviewed IR Angiogram done on 5/26 and notes from Dr. Cortney Dunlap (Neurointerventional), stenosis presumed to be due to premature arteriolosclerosis/artherosclerosis  - Will start on Pravastatin 40mg daily and ASA 81mg daily  - Follow up with Neurology     Labs, imaging and immunization ordered as above    Follow Up: in 1 month    Pt was discussed with Dr. Dara Galvez (attending physician). I have reviewed patient medical and social history and medications. I have reviewed pertinent labs results and other data. I have discussed the diagnosis with the patient and the intended plan as seen in the above orders. The patient has received an after-visit summary and questions were answered concerning future plans. I have discussed medication side effects and warnings with the patient as well.     Marc Tipton MD  Resident [facility]  06/06/22

## 2022-06-04 LAB
GAMMA INTERFERON BACKGROUND BLD IA-ACNC: 0 IU/ML
M TB IFN-G BLD-IMP: NEGATIVE
M TB IFN-G CD4+ BCKGRND COR BLD-ACNC: 0.04 IU/ML
MITOGEN IGNF BLD-ACNC: >10 IU/ML
QUANTIFERON INCUBATION, QF1T: NORMAL
QUANTIFERON TB2 AG: 0 IU/ML
SERVICE CMNT-IMP: NORMAL

## 2022-11-04 ENCOUNTER — OFFICE VISIT (OUTPATIENT)
Dept: FAMILY MEDICINE CLINIC | Age: 32
End: 2022-11-04
Payer: COMMERCIAL

## 2022-11-04 VITALS
RESPIRATION RATE: 17 BRPM | OXYGEN SATURATION: 100 % | HEART RATE: 79 BPM | DIASTOLIC BLOOD PRESSURE: 81 MMHG | TEMPERATURE: 97.8 F | HEIGHT: 70 IN | SYSTOLIC BLOOD PRESSURE: 135 MMHG | WEIGHT: 274 LBS | BODY MASS INDEX: 39.22 KG/M2

## 2022-11-04 DIAGNOSIS — I66.03 MIDDLE CEREBRAL ARTERY STENOSIS, BILATERAL: ICD-10-CM

## 2022-11-04 DIAGNOSIS — I70.90 ATHEROSCLEROSIS: ICD-10-CM

## 2022-11-04 DIAGNOSIS — I10 PRIMARY HYPERTENSION: Primary | ICD-10-CM

## 2022-11-04 DIAGNOSIS — Z86.73 HISTORY OF STROKE: ICD-10-CM

## 2022-11-04 PROBLEM — R51.9 NONINTRACTABLE HEADACHE: Status: ACTIVE | Noted: 2022-11-04

## 2022-11-04 PROCEDURE — 3078F DIAST BP <80 MM HG: CPT

## 2022-11-04 PROCEDURE — 99204 OFFICE O/P NEW MOD 45 MIN: CPT

## 2022-11-04 PROCEDURE — 3074F SYST BP LT 130 MM HG: CPT

## 2022-11-04 RX ORDER — PRAVASTATIN SODIUM 40 MG/1
40 TABLET ORAL
Qty: 90 TABLET | Refills: 3 | Status: SHIPPED | OUTPATIENT
Start: 2022-11-04

## 2022-11-04 RX ORDER — AMLODIPINE BESYLATE 5 MG/1
5 TABLET ORAL DAILY
Qty: 90 TABLET | Refills: 3 | Status: SHIPPED | OUTPATIENT
Start: 2022-11-04

## 2022-11-04 RX ORDER — ASPIRIN 81 MG/1
81 TABLET ORAL DAILY
Qty: 90 TABLET | Refills: 3 | Status: SHIPPED | OUTPATIENT
Start: 2022-11-04

## 2022-11-04 NOTE — PROGRESS NOTES
Chief Complaint   Patient presents with    Bleeding/Bruising    Arm swelling     Right arm times 2 days. 1. Have you been to the ER, urgent care clinic since your last visit? Hospitalized since your last visit? No    2. Have you seen or consulted any other health care providers outside of the [address] [place] [address] since your last visit? Include any pap smears or colon screening.  No

## 2022-11-04 NOTE — PROGRESS NOTES
[address] [place] [address]   Office [phone], Fax [phone]      Chief Complaint:     Chief Complaint   Patient presents with    Bleeding/Bruising    Arm swelling     Right arm times 2 days. Alejandro Finley is a 34 y.o. female that presents for: BP follow-up, med refills      Subjective:   HPI:  Alejandro Finley is a 34 y.o. female that presents for:    HTN  - Was taking amlodipine 5mg daily (ran out months ago, hasn't been able to come in d/t intense training for work at [employer] department)  - BP today 135/81  - /81 on 6/3, previously 719C systolic  - Home BP 572M/15G    H/o Stroke, MCA occlusion  - Needs refills for ASA, pravastatin    R arm bruise  - 2/2 shot gun recoil training for work  - Pt not concerned about it  - Taking tylenol, using ice as needed    ROS:   Review of Systems   Respiratory:  Negative for shortness of breath. Cardiovascular:  Negative for chest pain. Psychiatric/Behavioral:  Negative for depression. The patient is not nervous/anxious. Past medical history, social history, medications, and allergies personally reviewed. Past Medical History:   Diagnosis Date    Anemia     Cerebral artery occlusion with cerebral infarction ([facility] [address] 08/2021    Essential hypertension     Joint pain     Memory loss     Migraine     Muscle pain     Snoring     Stomach ulcer     TIA (transient ischemic attack)         Social Hx:   Alcohol: none  Tobacco: none  Illicit drug use: none    Medications:   Current Outpatient Medications   Medication Sig    OTHER Reports taking OTC iron that is much stronger than prescribed.     pravastatin (PRAVACHOL) 40 mg tablet TAKE ONE TABLET BY MOUTH AT BEDTIME (Patient not taking: Reported on 11/4/2022)    aspirin delayed-release 81 mg tablet TAKE ONE TABLET BY MOUTH ONE TIME DAILY (Patient not taking: Reported on 11/4/2022)    amLODIPine (NORVASC) 5 mg tablet TAKE ONE TABLET BY MOUTH ONE TIME DAILY (Patient not taking: Reported on 11/4/2022)     No current facility-administered medications for this visit. Allergies:  No Known Allergies     Health Maintenance:  Health Maintenance Due   Topic Date Due    Hepatitis C Screening  Never done    COVID-19 Vaccine (1) Never done    DTaP/Tdap/Td series (1 - Tdap) Never done    Cervical cancer screen  Never done    Flu Vaccine (1) Never done        Objective:   Vitals reviewed. Visit Vitals  /81   Pulse 79   Temp 97.8 °F (36.6 °C) (Temporal)   Resp 17   Ht 5' 10\" (1.778 m)   Wt 274 lb (124.3 kg)   SpO2 100%   BMI 39.31 kg/m²        Physical Exam:  General: No acute distress. Alert. Cooperative. Head: Normocephalic. Atraumatic. Mouth: Mucosa pink. Moist mucous membranes. Respiratory: No increased work of breathing. Symmetric chest rise b/l. Cardiovascular: RRR. No clubbing or cyanosis  GI: Nondistended. No masses. Extremities: No LE edema. Moving all extremities spontaneously. Musculoskeletal: Full ROM in all extremities, no obvious deformities. Skin: Warm, dry. No rashes. Bruise on R upper arm. Neuro: Alert, normal behavior. No focal deficit. Assessment/Plan:     1. Primary hypertension  The following orders have not been finalized:  -     amLODIPine (NORVASC) 5 mg tablet  2. Middle cerebral artery stenosis, bilateral  The following orders have not been finalized:  -     pravastatin (PRAVACHOL) 40 mg tablet  -     aspirin delayed-release 81 mg tablet  3. Atherosclerosis  The following orders have not been finalized:  -     pravastatin (PRAVACHOL) 40 mg tablet  -     aspirin delayed-release 81 mg tablet  4. History of stroke  The following orders have not been finalized:  -     aspirin delayed-release 81 mg tablet     R arm bruise: 2/2 shot gun recoil  - Expect to improve with time  - Encouraged to continue using tylenol and ice as needed     Follow up:   Return in about 6 months (around 5/4/2023) for HTN follow-up.     Pt was discussed with Dr Juan Carlos Bray (attending physician). I have reviewed pertinent labs results and other data. I have discussed the diagnosis with the patient and the intended plan as seen in the above orders. The patient has received an after-visit summary and questions were answered concerning future plans. I have discussed medication side effects and warnings with the patient as well.     Ganesh Cowan MD  Resident [facility]  11/04/22

## 2022-11-04 NOTE — LETTER
NOTIFICATION RETURN TO WORK / SCHOOL    11/4/2022 4:49 PM    Ms. Valentín Dutton  [phone]      To Whom It May Concern:    Valentín Dutton was under the care of [address] [facility] on 11/4/22. She will return to work/school on 11/4/22. 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 If there are questions or concerns please have the patient contact our office.         Sincerely,      Telly Key MD

## 2022-11-17 ENCOUNTER — TELEPHONE (OUTPATIENT)
Dept: NEUROSURGERY | Age: 32
End: 2022-11-17

## 2023-01-25 ENCOUNTER — NURSE TRIAGE (OUTPATIENT)
Dept: OTHER | Facility: CLINIC | Age: 33
End: 2023-01-25

## 2023-01-25 NOTE — TELEPHONE ENCOUNTER
Location of patient: [facility]  call from [place] at [facility] with [employer]. Subjective: Caller states she feels increased tiredness when sitting or resting. Pt states she is able to work. Possibly pregnant. Pt states she took Pregnancy 3 test and all work negative. Denies other symptoms. Current Symptoms: Fatigue    Onset: 1  month ago;     Associated Symptoms: NA    Pain Severity: 0/10; ;     Temperature: Denies     What has been tried: na    LMP:  12/7/22  Pregnant: Unknown    Recommended disposition: See PCP within 3 Days    Care advice provided, patient verbalizes understanding; denies any other questions or concerns; instructed to call back for any new or worsening symptoms. Patient/Caller agrees with recommended disposition; writer provided warm transfer to [facility] at [facility] for appointment scheduling    Attention Provider: Thank you for allowing me to participate in the care of your patient. The patient was connected to triage in response to information provided to the [employer]. Please do not respond through this encounter as the response is not directed to a shared pool.       Reason for Disposition   [1] Fatigue (i.e., tires easily, decreased energy) AND [2] persists > 1 week    Protocols used: Weakness (Generalized) and Fatigue-ADULT-

## 2023-01-26 ENCOUNTER — OFFICE VISIT (OUTPATIENT)
Dept: FAMILY MEDICINE CLINIC | Age: 33
End: 2023-01-26
Payer: COMMERCIAL

## 2023-01-26 VITALS
HEART RATE: 87 BPM | HEIGHT: 70 IN | RESPIRATION RATE: 16 BRPM | OXYGEN SATURATION: 97 % | WEIGHT: 282 LBS | SYSTOLIC BLOOD PRESSURE: 137 MMHG | BODY MASS INDEX: 40.37 KG/M2 | TEMPERATURE: 98 F | DIASTOLIC BLOOD PRESSURE: 82 MMHG

## 2023-01-26 DIAGNOSIS — R53.83 FATIGUE, UNSPECIFIED TYPE: Primary | ICD-10-CM

## 2023-01-26 DIAGNOSIS — N92.6 MISSED MENSES: ICD-10-CM

## 2023-01-26 LAB
HCG URINE, QL. (POC): NEGATIVE
VALID INTERNAL CONTROL?: YES

## 2023-01-26 NOTE — PROGRESS NOTES
Subjective  CC: Zeus Daley is an 34 y.o. female with a PMHx of HT, HLD who presents feeling tired. Fatigue:   Has been feeling more tired for the past couple of weeks. She feels sleepy in am and has been having some insomnia. She has known KISHOR and takes Iron daily, does not remember dosage. She is worry about pregnancy as is missing her menses this month. However, home UPT is neg x 3. Denies nausea, vomiting, hair falling, cold intolerance. Does not exercise. Eating is fine. Allergies - reviewed:   No Known Allergies      Medications - reviewed:   Current Outpatient Medications   Medication Sig    OTHER Reports taking OTC iron that is much stronger than prescribed. amLODIPine (NORVASC) 5 mg tablet Take 1 Tablet by mouth daily. (Patient not taking: Reported on 1/26/2023)    aspirin delayed-release 81 mg tablet Take 1 Tablet by mouth daily. (Patient not taking: Reported on 1/26/2023)    pravastatin (PRAVACHOL) 40 mg tablet Take 1 Tablet by mouth nightly. (Patient not taking: Reported on 1/26/2023)     No current facility-administered medications for this visit. Past Medical History - reviewed:  Past Medical History:   Diagnosis Date    Anemia     Cerebral artery occlusion with cerebral infarction ([facility] [address] 08/2021    Essential hypertension     Joint pain     Memory loss     Migraine     Muscle pain     Snoring     Stomach ulcer     TIA (transient ischemic attack)          Immunizations - reviewed: There is no immunization history on file for this patient. ROS  Negative besides what is written in HPI. Physical Exam  Visit Vitals  /82 (BP 1 Location: Right upper arm, BP Patient Position: Sitting)   Pulse 87   Temp 98 °F (36.7 °C) (Oral)   Resp 16   Ht 5' 10\" (1.778 m)   Wt 282 lb (127.9 kg)   LMP 12/07/2022   SpO2 97%   BMI 40.46 kg/m²       General appearance - Alert, NAD. Head: Atraumatic. Normocephalic. No lymphadenopathy  Eyes: EOMI. Sclera white.    Ears: Hearing grossly normal. TM non erythematous with no effusion   Nose: Nares patent, no polyps  Throat: pharynx clear, no exudates. Respiratory - LCTAB. No wheeze/rale/rhonchi  Heart - Normal rate, regular rhythm. No m/r/r  Abdomen - Soft, non tender. Non distended. Neurological - No focal deficits. Speech normal.   Musculoskeletal - Normal ROM, Gait normal.    Extremities - No LE edema. Distal pulses intact  Skin - normal coloration and normal turgor. No cyanosis, no rash. Assessment/Plan    Fatigue: Ddx is broad. Possibly KISHOR vs thyroid vs Vit D deficiency vs insomnia vs others.   - Reassurance  - Will work up above differentials. - Advised to take Iron daily - 325 mg tablet. - Sleep hygiene advised. - Can try melatonin  - Pregnancy is ruled out. - Will determine next plan of care based on results. - Follow up in 4 weeks. 2. Missed menses  - AMB POC URINE PREGNANCY TEST, VISUAL COLOR COMPARISON - Negative         I have discussed the aforementioned diagnoses and plan with the patient in detail. I have provided information in person and/or in AVS. All questions answered prior to discharge.     Mary Barriga MD  Family Medicine Resident

## 2023-01-26 NOTE — PROGRESS NOTES
Tomas Avina is a 34 y.o. female    Chief Complaint   Patient presents with    Fatigue     Patient feels sleep, nauseas, and craving everything. Patient stopped taking pravastatin, amlodipine, and aspirin because she thinks she might be pregnant. Her LMP was 12/07/2022. No other concerns. 1. Have you been to the ER, urgent care clinic since your last visit? Hospitalized since your last visit? No    2. Have you seen or consulted any other health care providers outside of the [address] [place] [address] since your last visit? Include any pap smears or colon screening. No      Visit Vitals  /82 (BP 1 Location: Right upper arm, BP Patient Position: Sitting)   Pulse 87   Temp 98 °F (36.7 °C) (Oral)   Resp 16   Ht 5' 10\" (1.778 m)   Wt 282 lb (127.9 kg)   SpO2 97%   BMI 40.46 kg/m²           Health Maintenance Due   Topic Date Due    Hepatitis C Screening  Never done    COVID-19 Vaccine (1) Never done    DTaP/Tdap/Td series (1 - Tdap) Never done    Cervical cancer screen  Never done    Flu Vaccine (1) Never done         Medication Reconciliation completed, changes noted.   Please  Update medication list.

## 2023-01-27 LAB
25(OH)D3 SERPL-MCNC: 13.3 NG/ML (ref 30–100)
ANION GAP SERPL CALC-SCNC: 2 MMOL/L (ref 5–15)
BUN SERPL-MCNC: 11 MG/DL (ref 6–20)
BUN/CREAT SERPL: 13 (ref 12–20)
CALCIUM SERPL-MCNC: 9.2 MG/DL (ref 8.5–10.1)
CHLORIDE SERPL-SCNC: 109 MMOL/L (ref 97–108)
CHOLEST SERPL-MCNC: 134 MG/DL
CO2 SERPL-SCNC: 26 MMOL/L (ref 21–32)
CREAT SERPL-MCNC: 0.82 MG/DL (ref 0.55–1.02)
CREAT UR-MCNC: 172 MG/DL
ERYTHROCYTE [DISTWIDTH] IN BLOOD BY AUTOMATED COUNT: 20.6 % (ref 11.5–14.5)
EST. AVERAGE GLUCOSE BLD GHB EST-MCNC: 105 MG/DL
GLUCOSE SERPL-MCNC: 102 MG/DL (ref 65–100)
HBA1C MFR BLD: 5.3 % (ref 4–5.6)
HCG SERPL-ACNC: <1 MIU/ML (ref 0–6)
HCT VFR BLD AUTO: 33.8 % (ref 35–47)
HDLC SERPL-MCNC: 40 MG/DL
HDLC SERPL: 3.4 (ref 0–5)
HGB BLD-MCNC: 9.9 G/DL (ref 11.5–16)
LDLC SERPL CALC-MCNC: 70.4 MG/DL (ref 0–100)
MCH RBC QN AUTO: 22.8 PG (ref 26–34)
MCHC RBC AUTO-ENTMCNC: 29.3 G/DL (ref 30–36.5)
MCV RBC AUTO: 77.9 FL (ref 80–99)
MICROALBUMIN UR-MCNC: 2.25 MG/DL
MICROALBUMIN/CREAT UR-RTO: 13 MG/G (ref 0–30)
NRBC # BLD: 0 K/UL (ref 0–0.01)
NRBC BLD-RTO: 0 PER 100 WBC
PLATELET # BLD AUTO: 281 K/UL (ref 150–400)
POTASSIUM SERPL-SCNC: 4 MMOL/L (ref 3.5–5.1)
RBC # BLD AUTO: 4.34 M/UL (ref 3.8–5.2)
SODIUM SERPL-SCNC: 137 MMOL/L (ref 136–145)
TRIGL SERPL-MCNC: 118 MG/DL (ref ?–150)
TSH SERPL DL<=0.05 MIU/L-ACNC: 1.77 UIU/ML (ref 0.36–3.74)
VLDLC SERPL CALC-MCNC: 23.6 MG/DL
WBC # BLD AUTO: 6.3 K/UL (ref 3.6–11)

## 2023-01-29 ENCOUNTER — PATIENT MESSAGE (OUTPATIENT)
Dept: FAMILY MEDICINE CLINIC | Age: 33
End: 2023-01-29

## 2023-01-29 DIAGNOSIS — E55.9 VITAMIN D DEFICIENCY: Primary | ICD-10-CM

## 2023-01-29 DIAGNOSIS — D50.9 IRON DEFICIENCY ANEMIA, UNSPECIFIED IRON DEFICIENCY ANEMIA TYPE: ICD-10-CM

## 2023-01-29 RX ORDER — LANOLIN ALCOHOL/MO/W.PET/CERES
325 CREAM (GRAM) TOPICAL
Qty: 90 TABLET | Refills: 1 | Status: SHIPPED | OUTPATIENT
Start: 2023-01-29

## 2023-01-29 RX ORDER — MELATONIN
1000 DAILY
Qty: 120 TABLET | Refills: 0 | Status: SHIPPED | OUTPATIENT
Start: 2023-01-29

## 2023-01-29 NOTE — PROGRESS NOTES
Beta HCG neg. Vit D deficiency - will replete  TSH normal   Lipid panel normal.   A1c normal   BMP basically normal.   H.9 - better - cont Iron.      [email or web address] message sent

## 2023-06-26 ENCOUNTER — NURSE TRIAGE (OUTPATIENT)
Dept: OTHER | Facility: CLINIC | Age: 33
End: 2023-06-26

## 2023-06-26 ENCOUNTER — OFFICE VISIT (OUTPATIENT)
Age: 33
End: 2023-06-26
Payer: COMMERCIAL

## 2023-06-26 VITALS
TEMPERATURE: 98.1 F | BODY MASS INDEX: 38.77 KG/M2 | HEART RATE: 86 BPM | OXYGEN SATURATION: 96 % | HEIGHT: 70 IN | DIASTOLIC BLOOD PRESSURE: 90 MMHG | WEIGHT: 270.8 LBS | SYSTOLIC BLOOD PRESSURE: 147 MMHG | RESPIRATION RATE: 16 BRPM

## 2023-06-26 DIAGNOSIS — G43.709 CHRONIC MIGRAINE W/O AURA W/O STATUS MIGRAINOSUS, NOT INTRACTABLE: Primary | ICD-10-CM

## 2023-06-26 DIAGNOSIS — I10 PRIMARY HYPERTENSION: ICD-10-CM

## 2023-06-26 DIAGNOSIS — G44.229 CHRONIC TENSION-TYPE HEADACHE, NOT INTRACTABLE: ICD-10-CM

## 2023-06-26 DIAGNOSIS — F43.9 STRESS: ICD-10-CM

## 2023-06-26 PROCEDURE — 3077F SYST BP >= 140 MM HG: CPT | Performed by: FAMILY MEDICINE

## 2023-06-26 PROCEDURE — 3080F DIAST BP >= 90 MM HG: CPT | Performed by: FAMILY MEDICINE

## 2023-06-26 PROCEDURE — 99214 OFFICE O/P EST MOD 30 MIN: CPT | Performed by: FAMILY MEDICINE

## 2023-06-26 RX ORDER — BUTALBITAL, ACETAMINOPHEN AND CAFFEINE 300; 40; 50 MG/1; MG/1; MG/1
CAPSULE ORAL
Qty: 10 CAPSULE | Refills: 0 | Status: SHIPPED | OUTPATIENT
Start: 2023-06-26

## 2023-06-26 SDOH — ECONOMIC STABILITY: FOOD INSECURITY: WITHIN THE PAST 12 MONTHS, YOU WORRIED THAT YOUR FOOD WOULD RUN OUT BEFORE YOU GOT MONEY TO BUY MORE.: NEVER TRUE

## 2023-06-26 SDOH — ECONOMIC STABILITY: INCOME INSECURITY: HOW HARD IS IT FOR YOU TO PAY FOR THE VERY BASICS LIKE FOOD, HOUSING, MEDICAL CARE, AND HEATING?: NOT HARD AT ALL

## 2023-06-26 SDOH — ECONOMIC STABILITY: FOOD INSECURITY: WITHIN THE PAST 12 MONTHS, THE FOOD YOU BOUGHT JUST DIDN'T LAST AND YOU DIDN'T HAVE MONEY TO GET MORE.: NEVER TRUE

## 2023-06-26 SDOH — ECONOMIC STABILITY: HOUSING INSECURITY
IN THE LAST 12 MONTHS, WAS THERE A TIME WHEN YOU DID NOT HAVE A STEADY PLACE TO SLEEP OR SLEPT IN A SHELTER (INCLUDING NOW)?: NO

## 2023-06-26 ASSESSMENT — PATIENT HEALTH QUESTIONNAIRE - PHQ9: DEPRESSION UNABLE TO ASSESS: PT REFUSES

## 2023-07-07 ENCOUNTER — NURSE ONLY (OUTPATIENT)
Age: 33
End: 2023-07-07

## 2023-07-07 ENCOUNTER — TELEPHONE (OUTPATIENT)
Age: 33
End: 2023-07-07

## 2023-07-07 DIAGNOSIS — Z11.1 SCREENING FOR TUBERCULOSIS: ICD-10-CM

## 2023-07-07 DIAGNOSIS — Z11.1 SCREENING FOR TUBERCULOSIS: Primary | ICD-10-CM

## 2023-07-07 NOTE — PROGRESS NOTES
Order placed for Tb screening with [employer] [facility] per patient request. Patient to schedule for lab visit.     Jean Pierre Greer MD

## 2023-07-07 NOTE — TELEPHONE ENCOUNTER
Patient called stating that she wanted to get a tb test done by blood draw. Would like for orders to be placed at your earliest convenience, so she can be contacted to schedule a lab appointment. Thanks!

## 2023-07-11 LAB
GAMMA INTERFERON BACKGROUND BLD IA-ACNC: 0.05 IU/ML
M TB IFN-G BLD-IMP: NEGATIVE
M TB IFN-G CD4+ T-CELLS BLD-ACNC: 0.1 IU/ML
M TBIFN-G CD4+ CD8+T-CELLS BLD-ACNC: 0.04 IU/ML
MITOGEN IGNF BLD-ACNC: >10 IU/ML
QUANTIFERON, INCUBATION: NORMAL
SERVICE CMNT-IMP: NORMAL

## 2023-10-16 ENCOUNTER — NURSE TRIAGE (OUTPATIENT)
Dept: OTHER | Facility: CLINIC | Age: 33
End: 2023-10-16

## 2023-10-16 ENCOUNTER — TELEPHONE (OUTPATIENT)
Age: 33
End: 2023-10-16

## 2023-10-16 NOTE — TELEPHONE ENCOUNTER
[facility]    Received call from [place] at [facility] with [employer]. Subjective: Caller states \"has had a headache for a couple of days. Insomnia, stressed out\"     Current Symptoms: Severe headache for 2 days    Onset: 2 days ago; intermittent, worsening    Associated Symptoms: diarrhea    Pain Severity: 10+/10; throbbing, shooting; severe    Temperature: Denies     What has been tried: NA    LMP:  2 weeks ago  Pregnant: No    Recommended disposition: See in Office Today or Tomorrow    Care advice provided, patient verbalizes understanding; denies any other questions or concerns; instructed to call back for any new or worsening symptoms. Patient/Caller agrees with recommended disposition; writer provided warm transfer to [employer] at [facility] for appointment scheduling    Attention Provider: Thank you for allowing me to participate in the care of your patient. The patient was connected to triage in response to information provided to the [facility]. Please do not respond through this encounter as the response is not directed to a shared pool.     Reason for Disposition   Unexplained headache that is present > 24 hours    Protocols used: Headache-ADULT-[place]

## 2023-10-17 ENCOUNTER — OFFICE VISIT (OUTPATIENT)
Age: 33
End: 2023-10-17

## 2023-10-17 VITALS
SYSTOLIC BLOOD PRESSURE: 152 MMHG | TEMPERATURE: 98.2 F | DIASTOLIC BLOOD PRESSURE: 97 MMHG | BODY MASS INDEX: 38.82 KG/M2 | HEART RATE: 84 BPM | RESPIRATION RATE: 14 BRPM | HEIGHT: 70 IN | WEIGHT: 271.2 LBS | OXYGEN SATURATION: 99 %

## 2023-10-17 DIAGNOSIS — G44.209 TENSION HEADACHE: Primary | ICD-10-CM

## 2023-10-17 PROCEDURE — 3077F SYST BP >= 140 MM HG: CPT | Performed by: FAMILY MEDICINE

## 2023-10-17 PROCEDURE — 3080F DIAST BP >= 90 MM HG: CPT | Performed by: FAMILY MEDICINE

## 2023-10-17 PROCEDURE — 99213 OFFICE O/P EST LOW 20 MIN: CPT

## 2023-10-17 NOTE — PATIENT INSTRUCTIONS
- neck stretches, heating pad to the back/neck/shoulders, ice pack to forehead, massage, trigger point injection, warm baths  - try to reduce stress in any way possible and prioritize sleep

## 2023-10-17 NOTE — PROGRESS NOTES
[facility], [address] [facility]   Office [phone], Fax [phone]      Chief Complaint:     Bj Diaz is a [address] [facility] y.o. female that presents for:   Chief Complaint   Patient presents with    Migraine     On going 2.5 weeks. Subjective:   HPI:    #HA: Started 2.5 wks ago, daily, on and off. Lasts about 2-6 hours when it occurs. Severity ranges 5-\"15\"/10, sensation of pressure. Located in temporal region. Worse with lack of food and lack of sleep. Has tried hydration, heating pad, hot showers, and sleep with some improvement. Denies nausea, photophobia, phonophobia, weakness, tingling, fevers. H/o HA includes similar headaches. Recently increased stress, has to call out of work to get any day off. Thinks stress is contributing. Does not desire medication at this time. Past medical history, social history, medications, and allergies personally reviewed.   Past Medical History:   Diagnosis Date    Anemia     Cerebral artery occlusion with cerebral infarction ([address]) 08/2021    Essential hypertension     Joint pain     Memory loss     Migraine     Muscle pain     Snoring     Stomach ulcer     TIA (transient ischemic attack)         Social Hx:   Social History     Socioeconomic History    Marital status: Single   Tobacco Use    Smoking status: Never    Smokeless tobacco: Never   Substance and Sexual Activity    Alcohol use: Yes    Drug use: Not Currently     Social Determinants of Health     Financial Resource Strain: Low Risk  (6/26/2023)    Overall Financial Resource Strain (CARDIA)     Difficulty of Paying Living Expenses: Not hard at all   Food Insecurity: No Food Insecurity (6/26/2023)    Hunger Vital Sign     Worried About Running Out of Food in the Last Year: Never true     Ran Out of Food in the Last Year: Never true   Transportation Needs: Unknown (6/26/2023)    PRAPARE - Transportation     Lack of Transportation (Non-Medical): No   Housing Stability: Unknown (6/26/2023)    Housing

## 2023-10-17 NOTE — PROGRESS NOTES
Inder Jose is a 28 y.o. female    Chief Complaint   Patient presents with    Migraine     On going 2.5 weeks. 1. Have you been to the ER, urgent care clinic since your last visit? Hospitalized since your last visit?no    2. Have you seen or consulted any other health care providers outside of the [address] [place] [address] Avenue since your last visit? Include any pap smears or colon screening. no    There were no vitals filed for this visit.        No data to display              Health Maintenance Due   Topic Date Due    Hepatitis B vaccine (1 of 3 - 3-dose series) Never done    COVID-19 Vaccine (1) Never done    Varicella vaccine (1 of 2 - 2-dose childhood series) Never done    HIV screen  Never done    Hepatitis C screen  Never done    DTaP/Tdap/Td vaccine (1 - Tdap) Never done    Cervical cancer screen  Never done    Flu vaccine (1) Never done    Depression Screen  11/04/2023

## 2023-10-18 ENCOUNTER — PATIENT MESSAGE (OUTPATIENT)
Age: 33
End: 2023-10-18

## 2023-10-24 DIAGNOSIS — Z76.89 REFERRAL OF PATIENT: Primary | ICD-10-CM

## 2023-11-02 ENCOUNTER — TELEPHONE (OUTPATIENT)
Age: 33
End: 2023-11-02

## 2023-11-02 DIAGNOSIS — I10 PRIMARY HYPERTENSION: Primary | ICD-10-CM

## 2023-11-02 NOTE — TELEPHONE ENCOUNTER
Hi Dr. Ron Hearn your message to patient. Answers:    Sometimes depends on how she lays. Yes  No    I asked patient is she has a blood pressure monitor at home she stated she does but has to find it. I told her I will give her a call in a couple days to make sure she has one so we cab schedule her a follow up.

## 2023-11-02 NOTE — TELEPHONE ENCOUNTER
Called patient to discuss unread mychart message. No answer. LVM and  was able to reach her.     Raffaele Arrington MD

## 2023-11-20 ENCOUNTER — TELEPHONE (OUTPATIENT)
Age: 33
End: 2023-11-20

## 2023-12-26 ENCOUNTER — TELEPHONE (OUTPATIENT)
Age: 33
End: 2023-12-26

## 2024-07-01 ENCOUNTER — OFFICE VISIT (OUTPATIENT)
Age: 34
End: 2024-07-01
Payer: COMMERCIAL

## 2024-07-01 ENCOUNTER — TELEPHONE (OUTPATIENT)
Age: 34
End: 2024-07-01

## 2024-07-01 VITALS
HEIGHT: 70 IN | WEIGHT: 266.2 LBS | HEART RATE: 80 BPM | OXYGEN SATURATION: 98 % | DIASTOLIC BLOOD PRESSURE: 80 MMHG | BODY MASS INDEX: 38.11 KG/M2 | SYSTOLIC BLOOD PRESSURE: 130 MMHG | RESPIRATION RATE: 14 BRPM | TEMPERATURE: 98.4 F

## 2024-07-01 DIAGNOSIS — F43.9 STRESS: ICD-10-CM

## 2024-07-01 DIAGNOSIS — Z86.73 HISTORY OF TIA (TRANSIENT ISCHEMIC ATTACK): Primary | ICD-10-CM

## 2024-07-01 DIAGNOSIS — F32.A ANXIETY AND DEPRESSION: ICD-10-CM

## 2024-07-01 DIAGNOSIS — F41.9 ANXIETY AND DEPRESSION: ICD-10-CM

## 2024-07-01 PROCEDURE — 99213 OFFICE O/P EST LOW 20 MIN: CPT

## 2024-07-01 PROCEDURE — 3075F SYST BP GE 130 - 139MM HG: CPT

## 2024-07-01 PROCEDURE — 3079F DIAST BP 80-89 MM HG: CPT

## 2024-07-01 SDOH — ECONOMIC STABILITY: FOOD INSECURITY: WITHIN THE PAST 12 MONTHS, YOU WORRIED THAT YOUR FOOD WOULD RUN OUT BEFORE YOU GOT MONEY TO BUY MORE.: PATIENT DECLINED

## 2024-07-01 SDOH — ECONOMIC STABILITY: INCOME INSECURITY: HOW HARD IS IT FOR YOU TO PAY FOR THE VERY BASICS LIKE FOOD, HOUSING, MEDICAL CARE, AND HEATING?: PATIENT DECLINED

## 2024-07-01 SDOH — ECONOMIC STABILITY: FOOD INSECURITY: WITHIN THE PAST 12 MONTHS, THE FOOD YOU BOUGHT JUST DIDN'T LAST AND YOU DIDN'T HAVE MONEY TO GET MORE.: PATIENT DECLINED

## 2024-07-01 SDOH — ECONOMIC STABILITY: HOUSING INSECURITY
IN THE LAST 12 MONTHS, WAS THERE A TIME WHEN YOU DID NOT HAVE A STEADY PLACE TO SLEEP OR SLEPT IN A SHELTER (INCLUDING NOW)?: PATIENT DECLINED

## 2024-07-01 ASSESSMENT — PATIENT HEALTH QUESTIONNAIRE - PHQ9
6. FEELING BAD ABOUT YOURSELF - OR THAT YOU ARE A FAILURE OR HAVE LET YOURSELF OR YOUR FAMILY DOWN: NEARLY EVERY DAY
4. FEELING TIRED OR HAVING LITTLE ENERGY: MORE THAN HALF THE DAYS
2. FEELING DOWN, DEPRESSED OR HOPELESS: SEVERAL DAYS
10. IF YOU CHECKED OFF ANY PROBLEMS, HOW DIFFICULT HAVE THESE PROBLEMS MADE IT FOR YOU TO DO YOUR WORK, TAKE CARE OF THINGS AT HOME, OR GET ALONG WITH OTHER PEOPLE: SOMEWHAT DIFFICULT
5. POOR APPETITE OR OVEREATING: NEARLY EVERY DAY
SUM OF ALL RESPONSES TO PHQ QUESTIONS 1-9: 13
SUM OF ALL RESPONSES TO PHQ9 QUESTIONS 1 & 2: 1
SUM OF ALL RESPONSES TO PHQ QUESTIONS 1-9: 13
3. TROUBLE FALLING OR STAYING ASLEEP: NEARLY EVERY DAY
SUM OF ALL RESPONSES TO PHQ QUESTIONS 1-9: 13
9. THOUGHTS THAT YOU WOULD BE BETTER OFF DEAD, OR OF HURTING YOURSELF: NOT AT ALL
7. TROUBLE CONCENTRATING ON THINGS, SUCH AS READING THE NEWSPAPER OR WATCHING TELEVISION: SEVERAL DAYS
8. MOVING OR SPEAKING SO SLOWLY THAT OTHER PEOPLE COULD HAVE NOTICED. OR THE OPPOSITE, BEING SO FIGETY OR RESTLESS THAT YOU HAVE BEEN MOVING AROUND A LOT MORE THAN USUAL: NOT AT ALL
1. LITTLE INTEREST OR PLEASURE IN DOING THINGS: NOT AT ALL
SUM OF ALL RESPONSES TO PHQ QUESTIONS 1-9: 13

## 2024-07-01 ASSESSMENT — ANXIETY QUESTIONNAIRES
7. FEELING AFRAID AS IF SOMETHING AWFUL MIGHT HAPPEN: NEARLY EVERY DAY
3. WORRYING TOO MUCH ABOUT DIFFERENT THINGS: MORE THAN HALF THE DAYS
2. NOT BEING ABLE TO STOP OR CONTROL WORRYING: SEVERAL DAYS
GAD7 TOTAL SCORE: 14
5. BEING SO RESTLESS THAT IT IS HARD TO SIT STILL: NOT AT ALL
1. FEELING NERVOUS, ANXIOUS, OR ON EDGE: NEARLY EVERY DAY
6. BECOMING EASILY ANNOYED OR IRRITABLE: NEARLY EVERY DAY
IF YOU CHECKED OFF ANY PROBLEMS ON THIS QUESTIONNAIRE, HOW DIFFICULT HAVE THESE PROBLEMS MADE IT FOR YOU TO DO YOUR WORK, TAKE CARE OF THINGS AT HOME, OR GET ALONG WITH OTHER PEOPLE: VERY DIFFICULT
4. TROUBLE RELAXING: MORE THAN HALF THE DAYS

## 2024-07-01 NOTE — PATIENT INSTRUCTIONS
[employer]  Phone: [phone]  Website: https://raaems.org/  [facility]  Phone: 306.667.1321  Website: https://www.amr.net/  [facility]  Phone: 814.981.8998  Website: https://www.[employer]/    All wheelchair van transportation services are private pay.  Call to requesting pricing information, based on their location, and the distance to the appointment.  [facility]  What they offer: Advanced Life Support, Basic Life Support, Critical Care Transport, Stretcher, Wheelchair, Bariatric Transport   Phone: 148.911.4940  Website: https://www.[employer]net/  [facility]    What they offer: Advanced Life Support, Basic Life Support, Critical Care Transport, Stretcher, Wheelchair, Ambulatory, Organ Procurement Transport  Website: https://[employer]/  Phone: (207) 658-2040  [facility]  What they offer: Stretcher transportation, wheelchair transportation, home accommodations, and mobility solutions such as stair lifts, ramps, and platform lifts   Website: http://www.[employer]/  Phone: (390) 677-2225

## 2024-07-01 NOTE — PROGRESS NOTES
Nunu Kaiser is a 34 y.o. female    Chief Complaint   Patient presents with    Mental Health Problem       \"Have you been to the ER, urgent care clinic since your last visit?  Hospitalized since your last visit?\"    NO    “Have you seen or consulted any other health care providers outside of [facility] since your last visit?”    NO        “Have you had a pap smear?”    NO    No cervical cancer screening on file            Vitals:    07/01/24 1858   BP: 130/80   Pulse: 80   Resp: 14   Temp: 98.4 °F (36.9 °C)   SpO2: 98%          No data to display              Health Maintenance Due   Topic Date Due    Hepatitis B vaccine (1 of 3 - 3-dose series) Never done    COVID-19 Vaccine (1) Never done    Varicella vaccine (1 of 2 - 2-dose childhood series) Never done    HIV screen  Never done    Hepatitis C screen  Never done    DTaP/Tdap/Td vaccine (1 - Tdap) Never done    Cervical cancer screen  Never done    Depression Screen  11/04/2023    Diabetes screen  05/20/2024

## 2024-07-01 NOTE — PROGRESS NOTES
[id number] [id number]    Office [phone], Fax [phone]      Subjective   Nunu Kiaser is a 34 y.o. female who presents for Mental Health Problem    #Stress, Anxiety, Stroke-like symptoms  - Stress symptoms have been going on for last weeks to months.   - Also reports episode on Saturday where her body went numb, mouths slurring,  felt like muscles were stuck. Lasted about 10-15 minutes. Currently asymptomatic.   - Has prior TIA in 2021. Was on ASA and statin, but not currently.   - Stressors: Work is major stressor. Works as security with long hours, little time off. Looking for a new job. Fiance not getting along. Argue about everything. About how to raise daughter, financial. Feels like no compromise in relationship. Reports she does feel safe at home.   - Current medication:  never been on medication.  - Counseling: never tried, but open to it.   - Sleeping:  waking up in middle of night.   - Social support:  has some friends who support her.   - No suicidal or homicidal thoughts     Review of Systems   Per HPI. All other systems reviewed and are negative.    Health Maintenance:  Health Maintenance Due   Topic Date Due    Hepatitis B vaccine (1 of 3 - 3-dose series) Never done    COVID-19 Vaccine (1) Never done    Varicella vaccine (1 of 2 - 2-dose childhood series) Never done    HIV screen  Never done    Hepatitis C screen  Never done    DTaP/Tdap/Td vaccine (1 - Tdap) Never done    Cervical cancer screen  Never done    Diabetes screen  05/20/2024        Medical History  Past Medical History:   Diagnosis Date    Anemia     Cerebral artery occlusion with cerebral infarction (HCC) 08/2021    Essential hypertension     Joint pain     Memory loss     Migraine     Muscle pain     Snoring     Stomach ulcer     TIA (transient ischemic attack)        Medications  Current Outpatient Medications   Medication Sig    butalbital-APAP-caffeine -40 MG CAPS per capsule Take 1

## 2024-07-01 NOTE — TELEPHONE ENCOUNTER
Spoke with patient who identified self with two patient identifiers(name and ).    On Release of Information Form? N/A, self    Patient Active Problem List   Diagnosis    History of TIA (transient ischemic attack)    History of stroke    Middle cerebral artery stenosis, bilateral    Caffeine abuse, continuous (HCC)    Family history of lupus erythematosus    Primary hypertension    Atherosclerosis    Nonintractable headache       No Known Allergies    Chief Complaint/Subjective: \"Is burn out real?\"    Onset: Saturday    Current Symptoms:   Stress  Can not concentrate    Associated Symptoms:   Stoke 2021    Current Pain Severity: 7    Maximum Pain Severity: 10    Location: confined to the shoulder, without radiation    Pain Quality: aching, burning    Better/Worse:     What has been tried:     STRESSOR(S):     Work: Security   Full-time employee  Per work, can not take vacation time until with company for 1 full year  Works 7 days a week  Shifts recently 8-hours, but has done 12-hour and 16-hour shifts  Significant other  Not helping at home  Being negative  Wont take out garbage  \"Controls the narrative\"  Daughter  Senior in High school  Job Interview  Clinicals for Dental Assistant   Driving    SUPPORT SYSTEM:  [x] Family [x] Friends [] School [] Work [] None       MENTAL STATUS:     Orientation:   [x] Person [x] Place [x] Time [x] Situation [] Disoriented    Speech:   [x] Organized/Clear [x] Coherent [] Rapid [x] Slowed [] Mumbling    Thought Process:   [x] Organized [x] Coherent [] Tangential [] Thought Blocking [] Flight of Ideas [] Poor Concentration [] Obsessive    Thought Content:   [x] Normal [] Delusional [] Grandiose [] Other    Perceptual Process:   [x] Normal [] Auditory hallucinations [] Visual hallucinations [] Other    Insight:   [x] Good [] Average [] Poor [] None    Judgment:   [x] Good [] Average [] Poor [] None    Mood:   [] Normal [] Hopeless [] Irritable [] Elevated [] Labile [x]